# Patient Record
Sex: MALE | Race: WHITE | NOT HISPANIC OR LATINO | Employment: OTHER | URBAN - METROPOLITAN AREA
[De-identification: names, ages, dates, MRNs, and addresses within clinical notes are randomized per-mention and may not be internally consistent; named-entity substitution may affect disease eponyms.]

---

## 2017-05-19 ENCOUNTER — TELEPHONE (OUTPATIENT)
Dept: CARDIOLOGY | Facility: CLINIC | Age: 72
End: 2017-05-19

## 2017-06-01 ENCOUNTER — OFFICE VISIT (OUTPATIENT)
Dept: CARDIOLOGY | Facility: CLINIC | Age: 72
End: 2017-06-01

## 2017-06-01 VITALS
DIASTOLIC BLOOD PRESSURE: 90 MMHG | BODY MASS INDEX: 23.86 KG/M2 | HEIGHT: 67 IN | WEIGHT: 152 LBS | HEART RATE: 50 BPM | SYSTOLIC BLOOD PRESSURE: 158 MMHG

## 2017-06-01 DIAGNOSIS — I73.9 PAD (PERIPHERAL ARTERY DISEASE) (HCC): ICD-10-CM

## 2017-06-01 DIAGNOSIS — I25.10 CORONARY ARTERY DISEASE INVOLVING NATIVE CORONARY ARTERY OF NATIVE HEART WITHOUT ANGINA PECTORIS: ICD-10-CM

## 2017-06-01 DIAGNOSIS — R00.1 SINUS BRADYCARDIA: Primary | ICD-10-CM

## 2017-06-01 PROCEDURE — 99213 OFFICE O/P EST LOW 20 MIN: CPT | Performed by: INTERNAL MEDICINE

## 2017-06-01 PROCEDURE — 93000 ELECTROCARDIOGRAM COMPLETE: CPT | Performed by: INTERNAL MEDICINE

## 2017-06-01 RX ORDER — LOSARTAN POTASSIUM 50 MG/1
50 TABLET ORAL 2 TIMES DAILY
COMMUNITY

## 2017-06-01 NOTE — PROGRESS NOTES
Subjective:     Encounter Date:06/01/2017      Patient ID: Vinod Cardenas is a 72 y.o. male.    Chief Complaint: CAD, PAD, bradycardia    History of Present Illness     Dear Ritesh Keller,     I had the pleasure of seeing your patient Vinod Cardenas in cardiac followup today.  As you well know, he is a nay 72-year-old man with history of smoking and lung cancer.  He has mild coronary artery disease on a heart catheterization.  He has had calf claudication which is mild.     It has been a year and a half since I have last seen him.  Over this time, his lung cancer has been quite stable.  He says it has not changed in some time.      He has developed symptomatic bradycardia.  His heart rate runs in the 40s to 50s.  He had a Holter monitor which revealed a bradycardia burden of 64%.  He also had frequent premature ventricular contractions.      His symptoms include dizziness and fatigue.  He has had no syncope.  He does feel palpitations.  He continues to smoke about one-third of a pack of cigarettes per day.          Review of Systems   All other systems reviewed and are negative.        ECG 12 Lead  Date/Time: 6/1/2017 10:19 AM  Performed by: LYNDSEY BHAT  Authorized by: LYNDSEY BHAT   Comparison: compared with previous ECG   Similar to previous ECG  Rhythm: sinus bradycardia  BPM: 50  Other findings: early transition               Objective:     Physical Exam   Constitutional: He is oriented to person, place, and time. He appears well-developed and well-nourished.   HENT:   Head: Normocephalic and atraumatic.   Neck: Normal range of motion. Neck supple.   Cardiovascular: Regular rhythm and normal heart sounds.  Bradycardia present.    Pulmonary/Chest: Effort normal and breath sounds normal.   Abdominal: Soft. Bowel sounds are normal.   Musculoskeletal: Normal range of motion.   Neurological: He is alert and oriented to person, place, and time.   Skin: Skin is warm and dry.   Psychiatric: He has a normal mood and  affect. His behavior is normal. Thought content normal.   Vitals reviewed.      Lab Review:       Assessment:          Diagnosis Plan   1. Sinus bradycardia     2. Coronary artery disease involving native coronary artery of native heart without angina pectoris     3. PAD (peripheral artery disease)            Plan:        It was a pleasure to see your patient in cardiac followup today.  His mild coronary artery disease is stable and asymptomatic.  He has mild peripheral arterial disease with claudication that is not limiting.  He has symptomatic bradycardia.  I offered him implantation of a dual chamber pacemaker.  Because he gets periodic MRIs for his lung cancer, he would have to have an MRI safe device.     He states that he would like to hold off of this for now as it is not an urgent issue.  He says that if his symptoms worsen or if he develops any worsened signs he will call me back to schedule this procedure.  Otherwise I will see him again in one year.      Coronary Artery Disease  Assessment  • The patient has no angina    Plan  • Lifestyle modifications discussed include adhering to a heart healthy diet, avoidance of tobacco products, maintenance of a healthy weight, medication compliance, regular exercise and regular monitoring of cholesterol and blood pressure    Subjective - Objective  • Current antiplatelet therapy includes aspirin 81 mg

## 2017-06-29 DIAGNOSIS — R00.1 SYMPTOMATIC BRADYCARDIA: Primary | ICD-10-CM

## 2017-07-21 ENCOUNTER — CLINICAL SUPPORT NO REQUIREMENTS (OUTPATIENT)
Dept: CARDIOLOGY | Facility: CLINIC | Age: 72
End: 2017-07-21

## 2017-07-21 ENCOUNTER — HOSPITAL ENCOUNTER (OUTPATIENT)
Facility: HOSPITAL | Age: 72
Setting detail: HOSPITAL OUTPATIENT SURGERY
Discharge: HOME | End: 2017-07-21
Attending: INTERNAL MEDICINE | Admitting: INTERNAL MEDICINE

## 2017-07-21 VITALS
RESPIRATION RATE: 16 BRPM | SYSTOLIC BLOOD PRESSURE: 145 MMHG | WEIGHT: 154 LBS | DIASTOLIC BLOOD PRESSURE: 82 MMHG | HEIGHT: 69 IN | BODY MASS INDEX: 22.81 KG/M2 | TEMPERATURE: 98.2 F | HEART RATE: 60 BPM | OXYGEN SATURATION: 97 %

## 2017-07-21 DIAGNOSIS — R00.1 SYMPTOMATIC BRADYCARDIA: ICD-10-CM

## 2017-07-21 DIAGNOSIS — R00.1 BRADYCARDIA: Primary | ICD-10-CM

## 2017-07-21 PROCEDURE — C1894 INTRO/SHEATH, NON-LASER: HCPCS | Performed by: INTERNAL MEDICINE

## 2017-07-21 PROCEDURE — 99153 MOD SED SAME PHYS/QHP EA: CPT | Performed by: INTERNAL MEDICINE

## 2017-07-21 PROCEDURE — C1785 PMKR, DUAL, RATE-RESP: HCPCS | Performed by: INTERNAL MEDICINE

## 2017-07-21 PROCEDURE — C1898 LEAD, PMKR, OTHER THAN TRANS: HCPCS | Performed by: INTERNAL MEDICINE

## 2017-07-21 PROCEDURE — C1769 GUIDE WIRE: HCPCS | Performed by: INTERNAL MEDICINE

## 2017-07-21 PROCEDURE — C1892 INTRO/SHEATH,FIXED,PEEL-AWAY: HCPCS | Performed by: INTERNAL MEDICINE

## 2017-07-21 PROCEDURE — 25010000002 FENTANYL CITRATE (PF) 100 MCG/2ML SOLUTION: Performed by: INTERNAL MEDICINE

## 2017-07-21 PROCEDURE — 99152 MOD SED SAME PHYS/QHP 5/>YRS: CPT | Performed by: INTERNAL MEDICINE

## 2017-07-21 PROCEDURE — 33208 INSRT HEART PM ATRIAL & VENT: CPT | Performed by: INTERNAL MEDICINE

## 2017-07-21 PROCEDURE — 25010000003 CEFAZOLIN IN D5W 1 GM/50ML SOLUTION: Performed by: INTERNAL MEDICINE

## 2017-07-21 PROCEDURE — 25010000002 MIDAZOLAM PER 1 MG: Performed by: INTERNAL MEDICINE

## 2017-07-21 DEVICE — IMPLANTABLE DEVICE: Type: IMPLANTABLE DEVICE | Status: FUNCTIONAL

## 2017-07-21 DEVICE — PACEMAKER
Type: IMPLANTABLE DEVICE | Status: FUNCTIONAL
Brand: ACCOLADE™ MRI DR

## 2017-07-21 RX ORDER — LIDOCAINE HYDROCHLORIDE 10 MG/ML
0.1 INJECTION, SOLUTION EPIDURAL; INFILTRATION; INTRACAUDAL; PERINEURAL ONCE AS NEEDED
Status: DISCONTINUED | OUTPATIENT
Start: 2017-07-21 | End: 2017-07-21 | Stop reason: HOSPADM

## 2017-07-21 RX ORDER — LIDOCAINE HYDROCHLORIDE 10 MG/ML
INJECTION, SOLUTION INFILTRATION; PERINEURAL AS NEEDED
Status: DISCONTINUED | OUTPATIENT
Start: 2017-07-21 | End: 2017-07-21 | Stop reason: HOSPADM

## 2017-07-21 RX ORDER — SODIUM CHLORIDE 9 MG/ML
75 INJECTION, SOLUTION INTRAVENOUS CONTINUOUS
Status: DISCONTINUED | OUTPATIENT
Start: 2017-07-21 | End: 2017-07-21 | Stop reason: HOSPADM

## 2017-07-21 RX ORDER — FENTANYL CITRATE 50 UG/ML
INJECTION, SOLUTION INTRAMUSCULAR; INTRAVENOUS AS NEEDED
Status: DISCONTINUED | OUTPATIENT
Start: 2017-07-21 | End: 2017-07-21 | Stop reason: HOSPADM

## 2017-07-21 RX ORDER — OXYCODONE HYDROCHLORIDE AND ACETAMINOPHEN 5; 325 MG/1; MG/1
1-2 TABLET ORAL EVERY 4 HOURS PRN
Qty: 15 TABLET | Refills: 0 | Status: SHIPPED | OUTPATIENT
Start: 2017-07-21

## 2017-07-21 RX ORDER — SODIUM CHLORIDE 0.9 % (FLUSH) 0.9 %
1-10 SYRINGE (ML) INJECTION AS NEEDED
Status: DISCONTINUED | OUTPATIENT
Start: 2017-07-21 | End: 2017-07-21 | Stop reason: HOSPADM

## 2017-07-21 RX ORDER — MIDAZOLAM HYDROCHLORIDE 1 MG/ML
INJECTION INTRAMUSCULAR; INTRAVENOUS AS NEEDED
Status: DISCONTINUED | OUTPATIENT
Start: 2017-07-21 | End: 2017-07-21 | Stop reason: HOSPADM

## 2017-07-21 RX ADMIN — CEFAZOLIN SODIUM 1 G: 1 INJECTION, SOLUTION INTRAVENOUS at 08:11

## 2017-07-21 RX ADMIN — SODIUM CHLORIDE 75 ML/HR: 9 INJECTION, SOLUTION INTRAVENOUS at 07:14

## 2017-07-21 NOTE — DISCHARGE INSTRUCTIONS
"[facility] Post Pacemaker / Defibrillator Implant Instructions    1. Resume aspirin on ________tomorrow__________________________.    2. The dressing may be removed the next day.    3. If steri-strips were used, they should not be removed. Allow them to \"fall off\".    4. You may shower after the dressing is removed. Do not allow shower water to hit directly on incision.    5. No lotion/powder/ointment/cream on incision until it is healed.    6. Gently wash incision daily with antibacterial soap and water and pat dry.    7. You may reapply a dressing if there is drainage, otherwise leave your incision open to air. If you reapply a dressing, please notify the pacemaker clinic.    8. No heavy lifting, pulling, or pushing.    9. Do not raise the affected arm over your head for a minimum of 1 month.    10. The pacemaker clinic will contact you (usually within 1 business day) to schedule a pacemaker/incision check. The check is usually done 7-10 days post-implant. If you have not heard from the pacemaker clinic within 3 days, please call the office.    11. Please call the office if you experience any of the following:  Ÿ bleeding or drainage from your incision  Ÿ swelling, redness, or opening of your incision  Ÿ fever or chills  Ÿ pain not relieved with medication  Ÿ chest pain or difficulty breathing  Ÿ lightheadness    12. For defibrillator patients only: If you receive a shock from your device, please call the office. If you receive 2 or more shocks within a 24 hour period OR if you receive 1 shock and feel poorly, you should be evaluated in the emergency room. Please DO NOT DRIVE if you have received a shock until your device has been checked.  "

## 2017-07-21 NOTE — H&P
History of Present Illness      Dear Ritesh Keller,      I had the pleasure of seeing your patient Vinod Cardenas in cardiac followup today.  As you well know, he is a nay 72-year-old man with history of smoking and lung cancer.  He has mild coronary artery disease on a heart catheterization.  He has had calf claudication which is mild.      It has been a year and a half since I have last seen him.  Over this time, his lung cancer has been quite stable.  He says it has not changed in some time.       He has developed symptomatic bradycardia.  His heart rate runs in the 40s to 50s.  He had a Holter monitor which revealed a bradycardia burden of 64%.  He also had frequent premature ventricular contractions.       His symptoms include dizziness and fatigue.  He has had no syncope.  He does feel palpitations.  He continues to smoke about one-third of a pack of cigarettes per day.             Review of Systems   All other systems reviewed and are negative.          ECG 12 Lead  Date/Time: 6/1/2017 10:19 AM  Performed by: LYNDSEY BHAT  Authorized by: LYNDSEY BHAT   Comparison: compared with previous ECG   Similar to previous ECG  Rhythm: sinus bradycardia  BPM: 50  Other findings: early transition               Objective:      Objective    Physical Exam   Constitutional: He is oriented to person, place, and time. He appears well-developed and well-nourished.   HENT:   Head: Normocephalic and atraumatic.   Neck: Normal range of motion. Neck supple.   Cardiovascular: Regular rhythm and normal heart sounds.  Bradycardia present.    Pulmonary/Chest: Effort normal and breath sounds normal.   Abdominal: Soft. Bowel sounds are normal.   Musculoskeletal: Normal range of motion.   Neurological: He is alert and oriented to person, place, and time.   Skin: Skin is warm and dry.   Psychiatric: He has a normal mood and affect. His behavior is normal. Thought content normal.   Vitals reviewed.        Lab Review:         Assessment:       Assessment            Diagnosis Plan   1. Sinus bradycardia      2. Coronary artery disease involving native coronary artery of native heart without angina pectoris      3. PAD (peripheral artery disease)               Plan:      Plan        It was a pleasure to see your patient in cardiac followup today.  His mild coronary artery disease is stable and asymptomatic.  He has mild peripheral arterial disease with claudication that is not limiting.  He has symptomatic bradycardia.  I offered him implantation of a dual chamber pacemaker.  Because he gets periodic MRIs for his lung cancer, he would have to have an MRI safe device.      He states that he would like to hold off of this for now as it is not an urgent issue.  He says that if his symptoms worsen or if he develops any worsened signs he will call me back to schedule this procedure.  Otherwise I will see him again in one year.       Coronary Artery Disease  Assessment  • The patient has no angina   Plan  • Lifestyle modifications discussed include adhering to a heart healthy diet, avoidance of tobacco products, maintenance of a healthy weight, medication compliance, regular exercise and regular monitoring of cholesterol and blood pressure   Subjective - Objective  • Current antiplatelet therapy includes aspirin 81 mg         H&P reviewed. The patient was examined and there are no changes to the H&P.

## 2017-07-21 NOTE — PLAN OF CARE
Problem: Patient Care Overview (Adult)  Goal: Plan of Care Review  Outcome: Outcome(s) achieved Date Met:  07/21/17 07/21/17 1008   Coping/Psychosocial Response Interventions   Plan Of Care Reviewed With patient;family   Patient Care Overview   Progress improving   Outcome Evaluation   Outcome Summary/Follow up Plan ready for discharge       Goal: Adult Individualization and Mutuality  Outcome: Outcome(s) achieved Date Met:  07/21/17  Goal: Discharge Needs Assessment  Outcome: Outcome(s) achieved Date Met:  07/21/17    Problem: Cardiac Rhythm Management Device (Adult)  Goal: Signs and Symptoms of Listed Potential Problems Will be Absent or Manageable (Cardiac Rhythm Management Device)  Outcome: Outcome(s) achieved Date Met:  07/21/17

## 2017-08-01 ENCOUNTER — CLINICAL SUPPORT NO REQUIREMENTS (OUTPATIENT)
Dept: CARDIOLOGY | Facility: CLINIC | Age: 72
End: 2017-08-01

## 2017-08-01 DIAGNOSIS — I49.5 SICK SINUS SYNDROME (HCC): Primary | ICD-10-CM

## 2017-08-01 PROCEDURE — 93280 PM DEVICE PROGR EVAL DUAL: CPT | Performed by: INTERNAL MEDICINE

## 2018-01-09 ENCOUNTER — CLINICAL SUPPORT NO REQUIREMENTS (OUTPATIENT)
Dept: CARDIOLOGY | Facility: CLINIC | Age: 73
End: 2018-01-09

## 2018-01-09 DIAGNOSIS — I49.5 SICK SINUS SYNDROME (HCC): Primary | ICD-10-CM

## 2018-01-09 PROCEDURE — 93280 PM DEVICE PROGR EVAL DUAL: CPT | Performed by: INTERNAL MEDICINE

## 2018-04-13 ENCOUNTER — CLINICAL SUPPORT NO REQUIREMENTS (OUTPATIENT)
Dept: CARDIOLOGY | Facility: CLINIC | Age: 73
End: 2018-04-13

## 2018-04-13 DIAGNOSIS — I49.5 SICK SINUS SYNDROME (HCC): Primary | ICD-10-CM

## 2018-04-13 PROCEDURE — 93296 REM INTERROG EVL PM/IDS: CPT | Performed by: INTERNAL MEDICINE

## 2018-04-13 PROCEDURE — 93294 REM INTERROG EVL PM/LDLS PM: CPT | Performed by: INTERNAL MEDICINE

## 2018-07-16 ENCOUNTER — CLINICAL SUPPORT NO REQUIREMENTS (OUTPATIENT)
Dept: CARDIOLOGY | Facility: CLINIC | Age: 73
End: 2018-07-16

## 2018-07-16 DIAGNOSIS — I49.5 SICK SINUS SYNDROME (HCC): Primary | ICD-10-CM

## 2018-07-16 PROCEDURE — 93280 PM DEVICE PROGR EVAL DUAL: CPT | Performed by: INTERNAL MEDICINE

## 2018-10-18 ENCOUNTER — CLINICAL SUPPORT NO REQUIREMENTS (OUTPATIENT)
Dept: CARDIOLOGY | Facility: CLINIC | Age: 73
End: 2018-10-18

## 2018-10-18 DIAGNOSIS — I49.5 SICK SINUS SYNDROME (HCC): Primary | ICD-10-CM

## 2018-10-18 PROCEDURE — 93294 REM INTERROG EVL PM/LDLS PM: CPT | Performed by: INTERNAL MEDICINE

## 2018-10-18 PROCEDURE — 93296 REM INTERROG EVL PM/IDS: CPT | Performed by: INTERNAL MEDICINE

## 2019-02-11 ENCOUNTER — CLINICAL SUPPORT NO REQUIREMENTS (OUTPATIENT)
Dept: CARDIOLOGY | Facility: CLINIC | Age: 74
End: 2019-02-11

## 2019-02-11 DIAGNOSIS — I49.5 SICK SINUS SYNDROME (HCC): Primary | ICD-10-CM

## 2019-02-11 PROCEDURE — 93294 REM INTERROG EVL PM/LDLS PM: CPT | Performed by: INTERNAL MEDICINE

## 2019-02-11 PROCEDURE — 93296 REM INTERROG EVL PM/IDS: CPT | Performed by: INTERNAL MEDICINE

## 2019-06-07 ENCOUNTER — CLINICAL SUPPORT NO REQUIREMENTS (OUTPATIENT)
Dept: CARDIOLOGY | Facility: CLINIC | Age: 74
End: 2019-06-07

## 2019-06-07 DIAGNOSIS — I49.5 SICK SINUS SYNDROME (HCC): Primary | ICD-10-CM

## 2019-06-07 PROCEDURE — 93280 PM DEVICE PROGR EVAL DUAL: CPT | Performed by: INTERNAL MEDICINE

## 2019-09-09 ENCOUNTER — CLINICAL SUPPORT NO REQUIREMENTS (OUTPATIENT)
Dept: CARDIOLOGY | Facility: CLINIC | Age: 74
End: 2019-09-09

## 2019-09-09 DIAGNOSIS — I49.5 SICK SINUS SYNDROME (HCC): Primary | ICD-10-CM

## 2019-09-09 PROCEDURE — 93296 REM INTERROG EVL PM/IDS: CPT | Performed by: INTERNAL MEDICINE

## 2019-09-09 PROCEDURE — 93294 REM INTERROG EVL PM/LDLS PM: CPT | Performed by: INTERNAL MEDICINE

## 2019-09-15 ENCOUNTER — CLINICAL SUPPORT NO REQUIREMENTS (OUTPATIENT)
Dept: CARDIOLOGY | Facility: CLINIC | Age: 74
End: 2019-09-15

## 2019-09-15 DIAGNOSIS — I49.5 SICK SINUS SYNDROME (HCC): Primary | ICD-10-CM

## 2020-01-03 ENCOUNTER — CLINICAL SUPPORT NO REQUIREMENTS (OUTPATIENT)
Dept: CARDIOLOGY | Facility: CLINIC | Age: 75
End: 2020-01-03

## 2020-01-03 DIAGNOSIS — I49.5 SICK SINUS SYNDROME (HCC): Primary | ICD-10-CM

## 2020-01-03 PROCEDURE — 93280 PM DEVICE PROGR EVAL DUAL: CPT | Performed by: INTERNAL MEDICINE

## 2020-04-09 ENCOUNTER — CLINICAL SUPPORT NO REQUIREMENTS (OUTPATIENT)
Dept: CARDIOLOGY | Facility: CLINIC | Age: 75
End: 2020-04-09

## 2020-04-09 DIAGNOSIS — R00.1 SYMPTOMATIC BRADYCARDIA: Primary | ICD-10-CM

## 2020-04-09 PROCEDURE — 93296 REM INTERROG EVL PM/IDS: CPT | Performed by: INTERNAL MEDICINE

## 2020-04-09 PROCEDURE — 93294 REM INTERROG EVL PM/LDLS PM: CPT | Performed by: INTERNAL MEDICINE

## 2021-02-12 ENCOUNTER — CLINICAL SUPPORT NO REQUIREMENTS (OUTPATIENT)
Dept: CARDIOLOGY | Facility: CLINIC | Age: 76
End: 2021-02-12

## 2021-02-12 DIAGNOSIS — I49.5 SICK SINUS SYNDROME (HCC): Primary | ICD-10-CM

## 2021-02-12 PROCEDURE — 93280 PM DEVICE PROGR EVAL DUAL: CPT | Performed by: INTERNAL MEDICINE

## 2021-05-27 ENCOUNTER — OFFICE VISIT (OUTPATIENT)
Dept: CARDIOLOGY | Facility: CLINIC | Age: 76
End: 2021-05-27

## 2021-05-27 VITALS
HEART RATE: 59 BPM | SYSTOLIC BLOOD PRESSURE: 150 MMHG | DIASTOLIC BLOOD PRESSURE: 78 MMHG | BODY MASS INDEX: 22.22 KG/M2 | WEIGHT: 150 LBS | HEIGHT: 69 IN | OXYGEN SATURATION: 98 %

## 2021-05-27 DIAGNOSIS — R06.09 EXERTIONAL DYSPNEA: Primary | ICD-10-CM

## 2021-05-27 PROCEDURE — 99204 OFFICE O/P NEW MOD 45 MIN: CPT | Performed by: INTERNAL MEDICINE

## 2021-05-27 RX ORDER — VARENICLINE TARTRATE 1 MG/1
TABLET, FILM COATED ORAL
COMMUNITY
Start: 2021-05-26

## 2021-05-27 RX ORDER — AMLODIPINE BESYLATE 5 MG/1
TABLET ORAL
COMMUNITY
Start: 2021-03-05

## 2021-05-27 RX ORDER — MELOXICAM 15 MG/1
15 TABLET ORAL DAILY PRN
COMMUNITY
Start: 2021-04-05

## 2021-05-27 NOTE — PROGRESS NOTES
[facility]      Patient Name: Vinod Cardenas  :1945  Age: 76 y.o.  Encounter Provider:  Eligio Abdullahi Jr, MD      Chief Complaint:   Chief Complaint   Patient presents with   • Coronary Artery Disease     follow up         HPI  Vinod Cardenas is a 76 y.o. male past medical history of nonobstructive coronary artery disease, lung cancer status post chemotherapy and radiation treatment currently in remission, ongoing tobacco abuse and hypertension who presents for initial evaluation with me.  Last time he was seen was in 2017 at which time he was having symptomatic bradycardia.  Pacemaker placed without complication patient has no further issues with dizziness.  Continues to have exertional dyspnea.  He denies chest pain or palpitations.  No syncope but complains of balance issues.  He is noted to have frequent ventricular ectopy on recent device interrogation.  As stated above he continues to smoke cigarettes.  He denies alcohol or illicit drug use.  Family history was reviewed and is not pertinent to this clinic visit.      The following portions of the patient's history were reviewed and updated as appropriate: allergies, current medications, past family history, past medical history, past social history, past surgical history and problem list.      Review of Systems   Constitutional: Negative for chills and fever.   HENT: Negative for hoarse voice and sore throat.    Eyes: Negative for double vision and photophobia.   Cardiovascular: Positive for dyspnea on exertion. Negative for chest pain, leg swelling, near-syncope, orthopnea, palpitations, paroxysmal nocturnal dyspnea and syncope.   Respiratory: Negative for cough and wheezing.    Skin: Negative for poor wound healing and rash.   Musculoskeletal: Negative for arthritis and joint swelling.   Gastrointestinal: Negative for bloating, abdominal pain, hematemesis and hematochezia.   Neurological: Positive for disturbances in  "coordination and loss of balance. Negative for dizziness and focal weakness.   Psychiatric/Behavioral: Negative for depression and suicidal ideas.       OBJECTIVE:   Vital Signs  Vitals:    05/27/21 1037   BP: 150/78   Pulse: 59   SpO2: 98%     Estimated body mass index is 22.15 kg/m² as calculated from the following:    Height as of this encounter: 175.3 cm (69\").    Weight as of this encounter: 68 kg (150 lb).    Vitals reviewed.   Constitutional:       Appearance: Not in distress. Cachectic and frail.   Neck:      Vascular: No JVR. JVD normal.   Pulmonary:      Effort: Pulmonary effort is normal.      Breath sounds: Normal breath sounds. No wheezing. No rhonchi. No rales.   Chest:      Chest wall: Not tender to palpatation.   Cardiovascular:      PMI at left midclavicular line. Normal rate. Regular rhythm. Normal S1. Normal S2.      Murmurs: There is no murmur.      No gallop. No click. No rub.   Pulses:     Intact distal pulses.   Edema:     Peripheral edema absent.   Abdominal:      General: Bowel sounds are normal.      Palpations: Abdomen is soft.      Tenderness: There is no abdominal tenderness.   Musculoskeletal: Normal range of motion.         General: No tenderness. Skin:     General: Skin is warm and dry.   Neurological:      General: No focal deficit present.      Mental Status: Alert and oriented to person, place and time.         Procedures          ASSESSMENT:     Symptomatic bradycardia status post pacemaker implantation  Frequent ventricular ectopy  Exertional dyspnea  Tobacco abuse  History of lung cancer and chemoradiation    PLAN OF CARE:     1. Frequent PVCs -patient seems asymptomatic.  Check echocardiogram especially concerning history of exposure to chemotherapy and thoracic radiation.  Monitor symptoms closely.  2. Symptomatic bradycardia -monitor closely in pacemaker clinic.  Normal device function.  3. Exertional dyspnea -checking echocardiogram.  No chest pain.  4. Tobacco abuse " -counseled on need for abstinence from nicotine products.  5. History of lung cancer and exposure to chemoradiation -echocardiogram as above    Return to clinic 6 months             Discharge Medications          Accurate as of May 27, 2021  5:10 PM. If you have any questions, ask your nurse or doctor.            Continue These Medications      Instructions Start Date   AMLODIPINE BESYLATE PO   2.5 mg, Oral, Daily      amLODIPine 5 MG tablet  Commonly known as: NORVASC   TAKE 1 TABLET BY MOUTH TWICE DAILY FOR HIGH BLOOD PRESSURE      aspirin 81 MG tablet   81 mg, Oral, Daily      carvedilol 6.25 MG tablet  Commonly known as: COREG   6.25 mg, Oral, Every Morning, Per pt, takes 3.125mg in the evening      Chantix Continuing Month Chai 1 MG tablet  Generic drug: varenicline   No dose, route, or frequency recorded.      furosemide 20 MG tablet  Commonly known as: LASIX   20 mg, Oral, Daily      levothyroxine 50 MCG tablet  Commonly known as: SYNTHROID, LEVOTHROID   50 mcg, Oral, Daily      loratadine 10 MG tablet  Commonly known as: CLARITIN   10 mg, Oral, Daily      losartan 50 MG tablet  Commonly known as: COZAAR   50 mg, Oral, 2 Times Daily      meloxicam 15 MG tablet  Commonly known as: MOBIC   15 mg, Oral, Daily      oxyCODONE-acetaminophen 5-325 MG per tablet  Commonly known as: PERCOCET   1-2 tablets, Oral, Every 4 Hours PRN      pravastatin 20 MG tablet  Commonly known as: PRAVACHOL   20 mg, Oral, Nightly             Thank you for allowing me to participate in the care of your patient,      Sincerely,   Eligio Abdullahi Jr, MD  [facility]  05/27/21  17:10 EDT

## 2021-06-04 ENCOUNTER — TELEPHONE (OUTPATIENT)
Dept: CARDIOLOGY | Facility: CLINIC | Age: 76
End: 2021-06-04

## 2021-07-16 ENCOUNTER — TELEPHONE (OUTPATIENT)
Dept: CARDIOLOGY | Facility: CLINIC | Age: 76
End: 2021-07-16

## 2021-10-11 PROCEDURE — 93296 REM INTERROG EVL PM/IDS: CPT | Performed by: INTERNAL MEDICINE

## 2021-10-11 PROCEDURE — 93294 REM INTERROG EVL PM/LDLS PM: CPT | Performed by: INTERNAL MEDICINE

## 2021-10-13 ENCOUNTER — TELEPHONE (OUTPATIENT)
Dept: URGENT CARE | Facility: CLINIC | Age: 76
End: 2021-10-13

## 2021-10-13 NOTE — TELEPHONE ENCOUNTER
I CALLED DR ANDINO'S OFFICE AND SPOKE TO MARGIE. I ADVISED HER THAT WE SAW MR DEAL TODAY AND TREATING;HIM FOR PNEUMONIA. I ALSO ADVISED HER THAT WITH HIS HISTORY OF LUNG CANCER THE RADIOLOGIST IS RECOMMENDING FOLLOW UP ON THIS CHEST XRAY. I INFORMED HER I WILL FAX THE XRAY REPORT AND OFFICE NOTES TO HER. I DID TELL HER THAT MR DEAL WAS ADVISED TO CALL AND SET UP APPT WITH DR ANDINO FOR FOLLOW UP.

## 2021-10-14 ENCOUNTER — TELEPHONE (OUTPATIENT)
Dept: URGENT CARE | Facility: CLINIC | Age: 76
End: 2021-10-14

## 2021-10-14 NOTE — TELEPHONE ENCOUNTER
I CALLED PATIENT TO GIVE HIM HIS COVID TEST RESULTS. I ALSO ASKED HOW HE WAS DOING; HE STATED HE WAS NOT COUGHING AS MUCH AND WAS ABLE TO REST. HE STATED HE TRIED TO CALL DR ANDINO'S OFFICE BUT THEY WERE AT [facility]UN[place]. I INFORMED HIM HE NEEDED TO GO AHEAD AND CALL THEM NOW TO SET UP FOLLOW UP APPT. hE THEN STATED THAT THIS MORNING HE COUGHED UP BRIGHT RED BLOOD A COUPLE OF TIMES. I SPOKE TO TOSHA Luo, AND SHE ADVISED HE NEEDS TO GO TO ER TO BE EVALUATED DUE TO HIS HEALTH HISTORY. I INFORMED MR DEAL THAT HE NEEDED TO  GO AHEAD AND CALL DR ANDINO'S OFFICE NOW BUT THAT SHAHEEN STATED WITH HIS HEALTH HISTORY HE NEEDED TO GO TO THE ER AND BE EVALUATED.

## 2021-10-15 ENCOUNTER — TELEPHONE (OUTPATIENT)
Dept: URGENT CARE | Facility: CLINIC | Age: 76
End: 2021-10-15

## 2022-01-10 PROCEDURE — 93294 REM INTERROG EVL PM/LDLS PM: CPT | Performed by: INTERNAL MEDICINE

## 2022-01-10 PROCEDURE — 93296 REM INTERROG EVL PM/IDS: CPT | Performed by: INTERNAL MEDICINE

## 2022-02-25 ENCOUNTER — CLINICAL SUPPORT NO REQUIREMENTS (OUTPATIENT)
Dept: CARDIOLOGY | Facility: CLINIC | Age: 77
End: 2022-02-25

## 2022-02-25 DIAGNOSIS — I49.5 SICK SINUS SYNDROME: Primary | ICD-10-CM

## 2022-02-25 PROCEDURE — 93280 PM DEVICE PROGR EVAL DUAL: CPT | Performed by: INTERNAL MEDICINE

## 2022-02-27 ENCOUNTER — TELEPHONE (OUTPATIENT)
Dept: CARDIOLOGY | Facility: CLINIC | Age: 77
End: 2022-02-27

## 2022-03-03 ENCOUNTER — OFFICE VISIT (OUTPATIENT)
Dept: CARDIOLOGY | Facility: CLINIC | Age: 77
End: 2022-03-03

## 2022-03-03 DIAGNOSIS — I49.5 SICK SINUS SYNDROME: Primary | ICD-10-CM

## 2022-03-03 DIAGNOSIS — R06.09 EXERTIONAL DYSPNEA: ICD-10-CM

## 2022-03-03 PROCEDURE — 99214 OFFICE O/P EST MOD 30 MIN: CPT | Performed by: INTERNAL MEDICINE

## 2022-03-03 NOTE — PROGRESS NOTES
[facility]      Patient Name: Vinod Cardenas  :1945  Age: 76 y.o.  Encounter Provider:  Eligio Abdullahi Jr, MD      Chief Complaint: Follow-up for evaluation and management of chronic medical issues      HPI  Vinod Cardenas is a 76 y.o. male past medical history of nonobstructive coronary artery disease, lung cancer status post chemotherapy and radiation treatment currently in remission, ongoing tobacco abuse and hypertension who presents for follow-up with me.  Last time he was seen was in 2017 at which time he was having symptomatic bradycardia.  Pacemaker placed without complication patient has no further issues with dizziness.  Continues to have exertional dyspnea.  He denies chest pain or palpitations.  No syncope but complains of balance issues.  He is noted to have frequent ventricular ectopy on recent device interrogation.  As stated above he continues to smoke cigarettes.  He denies alcohol or illicit drug use.     He is doing very well since last visit.  Chronic stable exertional dyspnea.  He started taking Chantix but noticed there was a cancer warning on the label and stopped taking the medication.  He still smoking about one third of a pack per day.  He denies angina.  No palpitations but occasional dizziness.  He feels that this is postural in nature and happens infrequently.  No episodes of syncope.  No orthopnea, PND or edema.  I reviewed the echocardiogram from last visit which showed normal heart function with no significant valvular heart disease.  Family history was reviewed and is not pertinent to this clinic visit.      The following portions of the patient's history were reviewed and updated as appropriate: allergies, current medications, past family history, past medical history, past social history, past surgical history and problem list.      Review of Systems   Constitutional: Negative for chills and fever.   HENT: Negative for hoarse voice and sore  "throat.    Eyes: Negative for double vision and photophobia.   Cardiovascular: Positive for dyspnea on exertion. Negative for chest pain, leg swelling, near-syncope, orthopnea, palpitations, paroxysmal nocturnal dyspnea and syncope.   Respiratory: Negative for cough and wheezing.    Skin: Negative for poor wound healing and rash.   Musculoskeletal: Negative for arthritis and joint swelling.   Gastrointestinal: Negative for bloating, abdominal pain, hematemesis and hematochezia.   Neurological: Positive for disturbances in coordination and loss of balance. Negative for dizziness and focal weakness.   Psychiatric/Behavioral: Negative for depression and suicidal ideas.     ROS was reviewed, updated and amended when necessary.    OBJECTIVE:   Vital Signs  There were no vitals filed for this visit.  Estimated body mass index is 22.15 kg/m² as calculated from the following:    Height as of 10/13/21: 175.3 cm (69\").    Weight as of 10/13/21: 68 kg (150 lb).    Vitals reviewed.   Constitutional:       Appearance: Not in distress. Cachectic and frail.   Neck:      Vascular: No JVR. JVD normal.   Pulmonary:      Effort: Pulmonary effort is normal.      Breath sounds: Normal breath sounds. No wheezing. No rhonchi. No rales.   Chest:      Chest wall: Not tender to palpatation.   Cardiovascular:      PMI at left midclavicular line. Normal rate. Regular rhythm. Normal S1. Normal S2.      Murmurs: There is no murmur.      No gallop. No click. No rub.   Pulses:     Intact distal pulses.   Edema:     Peripheral edema absent.   Abdominal:      General: Bowel sounds are normal.      Palpations: Abdomen is soft.      Tenderness: There is no abdominal tenderness.   Musculoskeletal: Normal range of motion.         General: No tenderness. Skin:     General: Skin is warm and dry.   Neurological:      General: No focal deficit present.      Mental Status: Alert and oriented to person, place and time.       Physical exam was reviewed, " updated and amended when necessary.    Procedures          ASSESSMENT:     Symptomatic bradycardia status post pacemaker implantation  Frequent ventricular ectopy  Exertional dyspnea  Tobacco abuse  History of lung cancer and chemoradiation    PLAN OF CARE:     1. Frequent PVCs -patient seems asymptomatic.  Echo with normal function.  Continue beta-blocker.  2. Symptomatic bradycardia -monitor closely in pacemaker clinic.  Device life 4.5 years.  Normal device function.  3. Exertional dyspnea -normal left ventricular systolic function.  More likely related to pulmonary issues.  Stable symptoms.  4. Tobacco abuse -counseled on need for abstinence from nicotine products.  5. History of lung cancer and exposure to chemoradiation -echocardiogram results showed normal left ventricular systolic function no significant valvular heart disease    Return to clinic 12 months             Discharge Medications          Accurate as of March 3, 2022 11:07 AM. If you have any questions, ask your nurse or doctor.            Continue These Medications      Instructions Start Date   AMLODIPINE BESYLATE PO   2.5 mg, Oral, Daily      amLODIPine 5 MG tablet  Commonly known as: NORVASC   TAKE 1 TABLET BY MOUTH TWICE DAILY FOR HIGH BLOOD PRESSURE      aspirin 81 MG tablet   81 mg, Oral, Daily      carvedilol 6.25 MG tablet  Commonly known as: COREG   6.25 mg, Oral, Every Morning, Per pt, takes 3.125mg in the evening      Chantix Continuing Month Chai 1 MG tablet  Generic drug: varenicline   No dose, route, or frequency recorded.      furosemide 20 MG tablet  Commonly known as: LASIX   20 mg, Oral, Daily      levothyroxine 50 MCG tablet  Commonly known as: SYNTHROID, LEVOTHROID   50 mcg, Oral, Daily      loratadine 10 MG tablet  Commonly known as: CLARITIN   10 mg, Oral, Daily      losartan 50 MG tablet  Commonly known as: COZAAR   50 mg, Oral, 2 Times Daily      meloxicam 15 MG tablet  Commonly known as: MOBIC   15 mg, Oral, Daily       methylPREDNISolone 4 MG dose pack  Commonly known as: MEDROL   Take as directed on package instructions.      oxyCODONE-acetaminophen 5-325 MG per tablet  Commonly known as: PERCOCET   1-2 tablets, Oral, Every 4 Hours PRN      pravastatin 20 MG tablet  Commonly known as: PRAVACHOL   20 mg, Oral, Nightly             Thank you for allowing me to participate in the care of your patient,      Sincerely,   Eligio Abdullahi Jr, MD  [facility]  03/03/22  11:07 EST

## 2022-03-04 ENCOUNTER — TELEPHONE (OUTPATIENT)
Dept: CARDIOLOGY | Facility: CLINIC | Age: 77
End: 2022-03-04

## 2022-03-04 VITALS
WEIGHT: 148.8 LBS | HEIGHT: 69 IN | HEART RATE: 66 BPM | DIASTOLIC BLOOD PRESSURE: 80 MMHG | BODY MASS INDEX: 22.04 KG/M2 | SYSTOLIC BLOOD PRESSURE: 146 MMHG

## 2022-03-04 NOTE — TELEPHONE ENCOUNTER
I just spoke to Dr Abdullahi. He didn't realize I saw pt last Friday for clinic testing. He doesn't need me to see him until he is due again.  NS

## 2022-03-04 NOTE — TELEPHONE ENCOUNTER
I just saw him last week and sent Dr Abdullahi a msg about his AF episodes? Pt has a remote monitor and due to send again April 1st. I know Dr Abdullahi asked to see pt in the office for a Dr Visit in response to my msg because its been over a year. Are you sure he wants me to see pt also? I can do an early transmission if he is looking for any further  AF episodes before his Dr appt. I wont be back at [facility] until 4/22/22.  NS

## 2022-05-02 ENCOUNTER — TELEPHONE (OUTPATIENT)
Dept: CARDIOLOGY | Facility: CLINIC | Age: 77
End: 2022-05-02

## 2022-05-12 ENCOUNTER — OFFICE VISIT (OUTPATIENT)
Dept: CARDIOLOGY | Facility: CLINIC | Age: 77
End: 2022-05-12

## 2022-05-12 VITALS
HEART RATE: 56 BPM | DIASTOLIC BLOOD PRESSURE: 70 MMHG | WEIGHT: 149.2 LBS | SYSTOLIC BLOOD PRESSURE: 130 MMHG | HEIGHT: 69 IN | BODY MASS INDEX: 22.1 KG/M2

## 2022-05-12 DIAGNOSIS — I49.5 SICK SINUS SYNDROME: Primary | ICD-10-CM

## 2022-05-12 DIAGNOSIS — I48.0 PAROXYSMAL ATRIAL FIBRILLATION: ICD-10-CM

## 2022-05-12 PROCEDURE — 99214 OFFICE O/P EST MOD 30 MIN: CPT | Performed by: INTERNAL MEDICINE

## 2022-05-13 RX ORDER — WARFARIN SODIUM 5 MG/1
5 TABLET ORAL
Qty: 30 TABLET | Refills: 11 | Status: SHIPPED | OUTPATIENT
Start: 2022-05-13

## 2022-05-16 PROCEDURE — 93000 ELECTROCARDIOGRAM COMPLETE: CPT | Performed by: INTERNAL MEDICINE

## 2022-05-16 NOTE — PROGRESS NOTES
[facility]      Patient Name: Vinod Cardenas  :1945  Age: 77 y.o.  Encounter Provider:  Eligio Abdullahi Jr, MD      Chief Complaint: Follow-up for evaluation and management of chronic medical issues      HPI  Vinod Cardenas is a 77 y.o. male past medical history of nonobstructive coronary artery disease, lung cancer status post chemotherapy and radiation treatment currently in remission, ongoing tobacco abuse and hypertension who presents for follow-up with me.  Last time he was seen was in 2017 at which time he was having symptomatic bradycardia.  Pacemaker placed without complication patient has no further issues with dizziness.  Continues to have exertional dyspnea.  He denies chest pain or palpitations.  No syncope but complains of balance issues.  He is noted to have frequent ventricular ectopy on recent device interrogation.  As stated above he continues to smoke cigarettes.  He denies alcohol or illicit drug use.     Recent device interrogations have shown high atrial rates with the most recent showing more convincing evidence for atrial fibrillation.  The longest incident was over 2 hours however his total burden is less than 1%.  He is relatively asymptomatic from this and continues to perform activities of daily living without limiting symptoms.  He denies chest pain or shortness of air.  No palpitations, dizziness syncope.  No orthopnea, PND or edema.  He has chronic stable dyspnea on exertion.  He is tolerating all other medications well.  [id number] score of 4.      The following portions of the patient's history were reviewed and updated as appropriate: allergies, current medications, past family history, past medical history, past social history, past surgical history and problem list.      Review of Systems   Constitutional: Negative for chills and fever.   HENT: Negative for hoarse voice and sore throat.    Eyes: Negative for double vision and photophobia.  "  Cardiovascular: Positive for dyspnea on exertion. Negative for chest pain, leg swelling, near-syncope, orthopnea, palpitations, paroxysmal nocturnal dyspnea and syncope.   Respiratory: Negative for cough and wheezing.    Skin: Negative for poor wound healing and rash.   Musculoskeletal: Negative for arthritis and joint swelling.   Gastrointestinal: Negative for bloating, abdominal pain, hematemesis and hematochezia.   Neurological: Positive for disturbances in coordination and loss of balance. Negative for dizziness and focal weakness.   Psychiatric/Behavioral: Negative for depression and suicidal ideas.     ROS was reviewed, updated and amended when necessary.    OBJECTIVE:   Vital Signs  Vitals:    05/12/22 0935   BP: 130/70   Pulse: 56     Estimated body mass index is 22.03 kg/m² as calculated from the following:    Height as of this encounter: 175.3 cm (69\").    Weight as of this encounter: 67.7 kg (149 lb 3.2 oz).    Vitals reviewed.   Constitutional:       Appearance: Not in distress. Cachectic and frail.   Neck:      Vascular: No JVR. JVD normal.   Pulmonary:      Effort: Pulmonary effort is normal.      Breath sounds: Normal breath sounds. No wheezing. No rhonchi. No rales.   Chest:      Chest wall: Not tender to palpatation.   Cardiovascular:      PMI at left midclavicular line. Normal rate. Regular rhythm. Normal S1. Normal S2.      Murmurs: There is no murmur.      No gallop. No click. No rub.   Pulses:     Intact distal pulses.   Edema:     Peripheral edema absent.   Abdominal:      General: Bowel sounds are normal.      Palpations: Abdomen is soft.      Tenderness: There is no abdominal tenderness.   Musculoskeletal: Normal range of motion.         General: No tenderness. Skin:     General: Skin is warm and dry.   Neurological:      General: No focal deficit present.      Mental Status: Alert and oriented to person, place and time.       Physical exam was reviewed, updated and amended when " necessary.      ECG 12 Lead    Date/Time: 5/16/2022 9:16 AM  Performed by: Eligio Abdullahi Jr., MD  Authorized by: Eligio Abdullahi Jr., MD   Comparison: compared with previous ECG from 3/3/2022  Similar to previous ECG  Rhythm: sinus rhythm  Ectopy: unifocal PVCs  Conduction: left anterior fascicular block  Other findings: left ventricular hypertrophy    Clinical impression: abnormal EKG                  ASSESSMENT:     Symptomatic bradycardia status post pacemaker implantation  Frequent ventricular ectopy  Exertional dyspnea  Tobacco abuse  History of lung cancer and chemoradiation    PLAN OF CARE:     1. Paroxysmal atrial fibrillation -previous interrogations have not been convincing for atrial fibrillation and could have easily been atrial tachycardia however most recent seems much more convincing with the extended episode of over 2 hours.  Patient does not want to incur any cost of medications such as would be potential with the DOAC so we will start him on Coumadin and have him seen in the [facility] anticoagulation clinic on Monday.  2. Frequent PVCs -patient seems asymptomatic.  Echo with normal function.  Continue beta-blocker.  3. Symptomatic bradycardia -monitor closely in pacemaker clinic.  Normal device function.  AT/AF burden noted as above.  4. Exertional dyspnea -normal left ventricular systolic function.  More likely related to pulmonary issues.  Stable symptoms.  5. Tobacco abuse -counseled on need for abstinence from nicotine products.  6. History of lung cancer and exposure to chemoradiation -echocardiogram results showed normal left ventricular systolic function no significant valvular heart disease    Return to clinic 6 months             Discharge Medications          Accurate as of May 12, 2022 11:59 PM. If you have any questions, ask your nurse or doctor.            New Medications      Instructions Start Date   warfarin 5 MG tablet  Commonly known as: COUMADIN  Started by: Eligio Abdullahi Jr, MD   5  mg, Oral, Daily Warfarin         Continue These Medications      Instructions Start Date   AMLODIPINE BESYLATE PO   2.5 mg, Oral, Daily      amLODIPine 5 MG tablet  Commonly known as: NORVASC   TAKE 1 TABLET BY MOUTH TWICE DAILY FOR HIGH BLOOD PRESSURE      aspirin 81 MG tablet   81 mg, Oral, Daily      carvedilol 6.25 MG tablet  Commonly known as: COREG   6.25 mg, Oral, 2 Times Daily With Meals      Chantix Continuing Month Chai 1 MG tablet  Generic drug: varenicline   No dose, route, or frequency recorded.      furosemide 20 MG tablet  Commonly known as: LASIX   20 mg, Oral, Daily      levothyroxine 50 MCG tablet  Commonly known as: SYNTHROID, LEVOTHROID   50 mcg, Oral, Daily      loratadine 10 MG tablet  Commonly known as: CLARITIN   10 mg, Oral, Daily      losartan 50 MG tablet  Commonly known as: COZAAR   50 mg, Oral, 2 Times Daily      meloxicam 15 MG tablet  Commonly known as: MOBIC   15 mg, Oral, Daily      methylPREDNISolone 4 MG dose pack  Commonly known as: MEDROL   Take as directed on package instructions.      oxyCODONE-acetaminophen 5-325 MG per tablet  Commonly known as: PERCOCET   1-2 tablets, Oral, Every 4 Hours PRN      pravastatin 20 MG tablet  Commonly known as: PRAVACHOL   20 mg, Oral, Nightly             Thank you for allowing me to participate in the care of your patient,      Sincerely,   Eligio Abdullahi Jr, MD  [facility]  05/16/22  09:13 EDT

## 2022-07-13 ENCOUNTER — TELEPHONE (OUTPATIENT)
Dept: URGENT CARE | Facility: CLINIC | Age: 77
End: 2022-07-13

## 2022-07-30 ENCOUNTER — APPOINTMENT (OUTPATIENT)
Dept: GENERAL RADIOLOGY | Facility: HOSPITAL | Age: 77
End: 2022-07-30

## 2022-07-30 ENCOUNTER — HOSPITAL ENCOUNTER (EMERGENCY)
Facility: HOSPITAL | Age: 77
Discharge: ANOTHER HOSPITAL | End: 2022-07-30
Attending: EMERGENCY MEDICINE | Admitting: EMERGENCY MEDICINE

## 2022-07-30 VITALS
HEART RATE: 60 BPM | HEIGHT: 64 IN | TEMPERATURE: 98.5 F | WEIGHT: 141.9 LBS | DIASTOLIC BLOOD PRESSURE: 65 MMHG | BODY MASS INDEX: 24.22 KG/M2 | RESPIRATION RATE: 24 BRPM | SYSTOLIC BLOOD PRESSURE: 137 MMHG | OXYGEN SATURATION: 96 %

## 2022-07-30 DIAGNOSIS — J18.9 PNEUMONIA OF RIGHT UPPER LOBE DUE TO INFECTIOUS ORGANISM: Primary | ICD-10-CM

## 2022-07-30 LAB
ALBUMIN SERPL-MCNC: 3.7 G/DL (ref 3.5–5.2)
ALBUMIN/GLOB SERPL: 1.3 G/DL
ALP SERPL-CCNC: 107 U/L (ref 39–117)
ALT SERPL W P-5'-P-CCNC: 8 U/L (ref 1–41)
ANION GAP SERPL CALCULATED.3IONS-SCNC: 9.5 MMOL/L (ref 5–15)
AST SERPL-CCNC: 14 U/L (ref 1–40)
BASOPHILS # BLD AUTO: 0.04 10*3/MM3 (ref 0–0.2)
BASOPHILS NFR BLD AUTO: 0.4 % (ref 0–1.5)
BILIRUB SERPL-MCNC: 0.6 MG/DL (ref 0–1.2)
BUN SERPL-MCNC: 21 MG/DL (ref 8–23)
BUN/CREAT SERPL: 16.4 (ref 7–25)
CALCIUM SPEC-SCNC: 9.3 MG/DL (ref 8.6–10.5)
CHLORIDE SERPL-SCNC: 100 MMOL/L (ref 98–107)
CO2 SERPL-SCNC: 24.5 MMOL/L (ref 22–29)
CREAT SERPL-MCNC: 1.28 MG/DL (ref 0.76–1.27)
D-LACTATE SERPL-SCNC: 1.1 MMOL/L (ref 0.5–2)
DEPRECATED RDW RBC AUTO: 44.6 FL (ref 37–54)
EGFRCR SERPLBLD CKD-EPI 2021: 56.9 ML/MIN/1.73
EOSINOPHIL # BLD AUTO: 0.08 10*3/MM3 (ref 0–0.4)
EOSINOPHIL NFR BLD AUTO: 0.8 % (ref 0.3–6.2)
ERYTHROCYTE [DISTWIDTH] IN BLOOD BY AUTOMATED COUNT: 13.7 % (ref 12.3–15.4)
GLOBULIN UR ELPH-MCNC: 2.9 GM/DL
GLUCOSE SERPL-MCNC: 95 MG/DL (ref 65–99)
HCT VFR BLD AUTO: 34.5 % (ref 37.5–51)
HGB BLD-MCNC: 11.7 G/DL (ref 13–17.7)
HOLD SPECIMEN: NORMAL
HOLD SPECIMEN: NORMAL
IMM GRANULOCYTES # BLD AUTO: 0.05 10*3/MM3 (ref 0–0.05)
IMM GRANULOCYTES NFR BLD AUTO: 0.5 % (ref 0–0.5)
INR PPP: 2.32 (ref 0.9–1.1)
LYMPHOCYTES # BLD AUTO: 1.11 10*3/MM3 (ref 0.7–3.1)
LYMPHOCYTES NFR BLD AUTO: 10.9 % (ref 19.6–45.3)
MCH RBC QN AUTO: 30.1 PG (ref 26.6–33)
MCHC RBC AUTO-ENTMCNC: 33.9 G/DL (ref 31.5–35.7)
MCV RBC AUTO: 88.7 FL (ref 79–97)
MONOCYTES # BLD AUTO: 0.98 10*3/MM3 (ref 0.1–0.9)
MONOCYTES NFR BLD AUTO: 9.7 % (ref 5–12)
NEUTROPHILS NFR BLD AUTO: 7.88 10*3/MM3 (ref 1.7–7)
NEUTROPHILS NFR BLD AUTO: 77.7 % (ref 42.7–76)
NRBC BLD AUTO-RTO: 0 /100 WBC (ref 0–0.2)
PLATELET # BLD AUTO: 180 10*3/MM3 (ref 140–450)
PMV BLD AUTO: 10.4 FL (ref 6–12)
POTASSIUM SERPL-SCNC: 4.3 MMOL/L (ref 3.5–5.2)
PROCALCITONIN SERPL-MCNC: 0.06 NG/ML (ref 0–0.25)
PROT SERPL-MCNC: 6.6 G/DL (ref 6–8.5)
PROTHROMBIN TIME: 25.9 SECONDS (ref 12.1–15)
RBC # BLD AUTO: 3.89 10*6/MM3 (ref 4.14–5.8)
SODIUM SERPL-SCNC: 134 MMOL/L (ref 136–145)
TROPONIN T SERPL-MCNC: <0.01 NG/ML (ref 0–0.03)
WBC NRBC COR # BLD: 10.14 10*3/MM3 (ref 3.4–10.8)
WHOLE BLOOD HOLD COAG: NORMAL
WHOLE BLOOD HOLD SPECIMEN: NORMAL

## 2022-07-30 PROCEDURE — 84145 PROCALCITONIN (PCT): CPT | Performed by: EMERGENCY MEDICINE

## 2022-07-30 PROCEDURE — 80053 COMPREHEN METABOLIC PANEL: CPT | Performed by: EMERGENCY MEDICINE

## 2022-07-30 PROCEDURE — 84484 ASSAY OF TROPONIN QUANT: CPT | Performed by: EMERGENCY MEDICINE

## 2022-07-30 PROCEDURE — 87040 BLOOD CULTURE FOR BACTERIA: CPT | Performed by: EMERGENCY MEDICINE

## 2022-07-30 PROCEDURE — 99284 EMERGENCY DEPT VISIT MOD MDM: CPT

## 2022-07-30 PROCEDURE — 71046 X-RAY EXAM CHEST 2 VIEWS: CPT

## 2022-07-30 PROCEDURE — 93005 ELECTROCARDIOGRAM TRACING: CPT | Performed by: EMERGENCY MEDICINE

## 2022-07-30 PROCEDURE — 36415 COLL VENOUS BLD VENIPUNCTURE: CPT

## 2022-07-30 PROCEDURE — 93005 ELECTROCARDIOGRAM TRACING: CPT

## 2022-07-30 PROCEDURE — 93010 ELECTROCARDIOGRAM REPORT: CPT | Performed by: INTERNAL MEDICINE

## 2022-07-30 PROCEDURE — 85025 COMPLETE CBC W/AUTO DIFF WBC: CPT | Performed by: EMERGENCY MEDICINE

## 2022-07-30 PROCEDURE — 85610 PROTHROMBIN TIME: CPT | Performed by: EMERGENCY MEDICINE

## 2022-07-30 PROCEDURE — 83605 ASSAY OF LACTIC ACID: CPT | Performed by: EMERGENCY MEDICINE

## 2022-07-30 RX ORDER — CEFDINIR 300 MG/1
300 CAPSULE ORAL 2 TIMES DAILY
Qty: 20 CAPSULE | Refills: 0 | Status: SHIPPED | OUTPATIENT
Start: 2022-07-30 | End: 2022-08-09

## 2022-07-30 RX ORDER — CEFDINIR 300 MG/1
300 CAPSULE ORAL ONCE
Status: COMPLETED | OUTPATIENT
Start: 2022-07-30 | End: 2022-07-30

## 2022-07-30 RX ADMIN — CEFDINIR 300 MG: 300 CAPSULE ORAL at 18:49

## 2022-07-31 LAB — QT INTERVAL: 390 MS

## 2022-08-04 LAB
BACTERIA SPEC AEROBE CULT: NORMAL
BACTERIA SPEC AEROBE CULT: NORMAL

## 2023-01-27 PROCEDURE — 93296 REM INTERROG EVL PM/IDS: CPT | Performed by: INTERNAL MEDICINE

## 2023-01-27 PROCEDURE — 93294 REM INTERROG EVL PM/LDLS PM: CPT | Performed by: INTERNAL MEDICINE

## 2023-02-14 ENCOUNTER — OFFICE VISIT (OUTPATIENT)
Dept: CARDIOLOGY | Facility: CLINIC | Age: 78
End: 2023-02-14
Payer: MEDICARE

## 2023-02-14 ENCOUNTER — CLINICAL SUPPORT NO REQUIREMENTS (OUTPATIENT)
Dept: CARDIOLOGY | Facility: CLINIC | Age: 78
End: 2023-02-14
Payer: MEDICARE

## 2023-02-14 VITALS
WEIGHT: 150 LBS | HEIGHT: 64 IN | DIASTOLIC BLOOD PRESSURE: 78 MMHG | HEART RATE: 59 BPM | BODY MASS INDEX: 25.61 KG/M2 | OXYGEN SATURATION: 98 % | SYSTOLIC BLOOD PRESSURE: 134 MMHG

## 2023-02-14 DIAGNOSIS — I48.0 PAROXYSMAL ATRIAL FIBRILLATION: ICD-10-CM

## 2023-02-14 DIAGNOSIS — I49.5 SICK SINUS SYNDROME: Primary | ICD-10-CM

## 2023-02-14 PROCEDURE — 99213 OFFICE O/P EST LOW 20 MIN: CPT | Performed by: INTERNAL MEDICINE

## 2023-02-14 PROCEDURE — 93280 PM DEVICE PROGR EVAL DUAL: CPT | Performed by: INTERNAL MEDICINE

## 2023-02-14 NOTE — PROGRESS NOTES
[facility]      Patient Name: Vinod Cardenas  :1945  Age: 77 y.o.  Encounter Provider:  Eligio Abdullahi Jr, MD      Chief Complaint: Follow-up for evaluation and management of chronic medical issues      HPI  Vinod Cardenas is a 77 y.o. male past medical history of nonobstructive coronary artery disease, lung cancer status post chemotherapy and radiation treatment currently in remission, ongoing tobacco abuse and hypertension who presents for follow-up with me.     Summary of clinic visitation: Last time he was seen was in 2017 at which time he was having symptomatic bradycardia.  Pacemaker placed without complication patient has no further issues with dizziness.  Continues to have exertional dyspnea.  He denies chest pain or palpitations.  No syncope but complains of balance issues.  He is noted to have frequent ventricular ectopy on recent device interrogation.  As stated above he continues to smoke cigarettes.  He denies alcohol or illicit drug use.     Recent device interrogations have shown high atrial rates with the most recent showing more convincing evidence for atrial fibrillation.  The longest incident was over 2 hours however his total burden is less than 1%.  He is relatively asymptomatic from this and continues to perform activities of daily living without limiting symptoms.  He denies chest pain or shortness of air.  No palpitations, dizziness syncope.  No orthopnea, PND or edema.  He has chronic stable dyspnea on exertion.  He is tolerating all other medications well.  [id number] score of 4.    Doing fairly well since last visit.  Unfortunately continues to smoke cigarettes.  He was admitted in 2022 for pneumonia/flu but is recovered well from that.  Device interrogation today shows normal device function with 3-1/2 years battery life.  He has some high atrial rates which is unchanged.  He is asymptomatic from this.  He denies chest pain or shortness of air  "with activity.  Blood pressure and heart rate are well controlled today in clinic.    The following portions of the patient's history were reviewed and updated as appropriate: allergies, current medications, past family history, past medical history, past social history, past surgical history and problem list.      Review of Systems   Constitutional: Negative for chills and fever.   HENT: Negative for hoarse voice and sore throat.    Eyes: Negative for double vision and photophobia.   Cardiovascular: Positive for dyspnea on exertion. Negative for chest pain, leg swelling, near-syncope, orthopnea, palpitations, paroxysmal nocturnal dyspnea and syncope.   Respiratory: Negative for cough and wheezing.    Skin: Negative for poor wound healing and rash.   Musculoskeletal: Negative for arthritis and joint swelling.   Gastrointestinal: Negative for bloating, abdominal pain, hematemesis and hematochezia.   Neurological: Positive for disturbances in coordination and loss of balance. Negative for dizziness and focal weakness.   Psychiatric/Behavioral: Negative for depression and suicidal ideas.     ROS was reviewed, updated and amended when necessary.    OBJECTIVE:   Vital Signs  Vitals:    02/14/23 1308   BP: 134/78   Pulse: 59   SpO2: 98%     Estimated body mass index is 25.75 kg/m² as calculated from the following:    Height as of this encounter: 162.6 cm (64\").    Weight as of this encounter: 68 kg (150 lb).    Vitals reviewed.   Constitutional:       Appearance: Not in distress. Cachectic and frail.   Neck:      Vascular: No JVR. JVD normal.   Pulmonary:      Effort: Pulmonary effort is normal.      Breath sounds: Normal breath sounds. No wheezing. No rhonchi. No rales.   Chest:      Chest wall: Not tender to palpatation.   Cardiovascular:      PMI at left midclavicular line. Normal rate. Regular rhythm. Normal S1. Normal S2.      Murmurs: There is no murmur.      No gallop. No click. No rub.   Pulses:     Intact distal " pulses.   Edema:     Peripheral edema absent.   Abdominal:      General: Bowel sounds are normal.      Palpations: Abdomen is soft.      Tenderness: There is no abdominal tenderness.   Musculoskeletal: Normal range of motion.         General: No tenderness. Skin:     General: Skin is warm and dry.   Neurological:      General: No focal deficit present.      Mental Status: Alert and oriented to person, place and time.       Physical exam was reviewed, updated and amended when necessary.    Procedures          ASSESSMENT:     Symptomatic bradycardia status post pacemaker implantation  Frequent ventricular ectopy  Exertional dyspnea  Tobacco abuse  History of lung cancer and chemoradiation    PLAN OF CARE:     1. Paroxysmal atrial fibrillation -continue carvedilol and warfarin.  He will inquire about home INR monitor at [facility] anticoagulation clinic.  Last INR 2.5.  2. Frequent PVCs -patient seems asymptomatic.  Echo with normal function.  Continue beta-blocker.  3. Symptomatic bradycardia -monitor closely in pacemaker clinic.  Normal device function.  AT/AF burden noted as above.  4. Exertional dyspnea -normal left ventricular systolic function.  More likely related to pulmonary issues.  Stable symptoms.  5. Tobacco abuse -counseled on need for abstinence from nicotine products.  6. History of lung cancer and exposure to chemoradiation -echocardiogram results showed normal left ventricular systolic function no significant valvular heart disease    Return to clinic 12 months             Discharge Medications          Accurate as of February 14, 2023  1:10 PM. If you have any questions, ask your nurse or doctor.            Changes to Medications      Instructions Start Date   warfarin 5 MG tablet  Commonly known as: COUMADIN  What changed: additional instructions   5 mg, Oral, Daily Warfarin         Continue These Medications      Instructions Start Date   AMLODIPINE BESYLATE PO   2.5 mg, Oral, Daily      amLODIPine 5 MG  tablet  Commonly known as: NORVASC   TAKE 1 TABLET BY MOUTH TWICE DAILY FOR HIGH BLOOD PRESSURE      aspirin 81 MG tablet   81 mg, Oral, Daily      carvedilol 6.25 MG tablet  Commonly known as: COREG   6.25 mg, Oral, 2 Times Daily With Meals      Chantix Continuing Month Chai 1 MG tablet  Generic drug: varenicline   No dose, route, or frequency recorded.      furosemide 20 MG tablet  Commonly known as: LASIX   20 mg, Oral, As Needed      levothyroxine 50 MCG tablet  Commonly known as: SYNTHROID, LEVOTHROID   50 mcg, Oral, Daily      loratadine 10 MG tablet  Commonly known as: CLARITIN   10 mg, Oral, Daily      losartan 50 MG tablet  Commonly known as: COZAAR   50 mg, Oral, 2 Times Daily      meloxicam 15 MG tablet  Commonly known as: MOBIC   15 mg, Oral, Daily PRN      methylPREDNISolone 4 MG dose pack  Commonly known as: MEDROL   Take as directed on package instructions.      oxyCODONE-acetaminophen 5-325 MG per tablet  Commonly known as: PERCOCET   1-2 tablets, Oral, Every 4 Hours PRN      pravastatin 20 MG tablet  Commonly known as: PRAVACHOL   20 mg, Oral, Nightly             Thank you for allowing me to participate in the care of your patient,      Sincerely,   Eligio Abdullahi MD  [facility]  02/14/23  13:10 EST

## 2023-04-28 PROCEDURE — 93294 REM INTERROG EVL PM/LDLS PM: CPT | Performed by: INTERNAL MEDICINE

## 2023-04-28 PROCEDURE — 93296 REM INTERROG EVL PM/IDS: CPT | Performed by: INTERNAL MEDICINE

## 2023-07-28 PROCEDURE — 93296 REM INTERROG EVL PM/IDS: CPT | Performed by: INTERNAL MEDICINE

## 2023-07-28 PROCEDURE — 93294 REM INTERROG EVL PM/LDLS PM: CPT | Performed by: INTERNAL MEDICINE

## 2023-09-06 RX ORDER — WARFARIN SODIUM 5 MG/1
TABLET ORAL
Qty: 30 TABLET | Refills: 0 | OUTPATIENT
Start: 2023-09-06

## 2023-09-07 RX ORDER — WARFARIN SODIUM 5 MG/1
TABLET ORAL
Qty: 30 TABLET | Refills: 0 | OUTPATIENT
Start: 2023-09-07

## 2024-04-18 ENCOUNTER — OFFICE VISIT (OUTPATIENT)
Dept: CARDIOLOGY | Facility: CLINIC | Age: 79
End: 2024-04-18
Payer: MEDICARE

## 2024-04-18 ENCOUNTER — CLINICAL SUPPORT NO REQUIREMENTS (OUTPATIENT)
Age: 79
End: 2024-04-18
Payer: MEDICARE

## 2024-04-18 VITALS
DIASTOLIC BLOOD PRESSURE: 80 MMHG | HEART RATE: 57 BPM | SYSTOLIC BLOOD PRESSURE: 150 MMHG | BODY MASS INDEX: 24.72 KG/M2 | WEIGHT: 148.4 LBS | HEIGHT: 65 IN

## 2024-04-18 DIAGNOSIS — Z95.0 PACEMAKER: ICD-10-CM

## 2024-04-18 DIAGNOSIS — I48.0 PAROXYSMAL ATRIAL FIBRILLATION: Primary | ICD-10-CM

## 2024-04-18 DIAGNOSIS — I49.5 SSS (SICK SINUS SYNDROME): Primary | ICD-10-CM

## 2024-04-18 DIAGNOSIS — R00.1 SYMPTOMATIC BRADYCARDIA: ICD-10-CM

## 2024-04-18 DIAGNOSIS — I49.3 FREQUENT PVCS: ICD-10-CM

## 2024-04-18 NOTE — PROGRESS NOTES
[facility]      Patient Name: Vinod Cardenas  :1945  Age: 79 y.o.  Encounter Provider:  Eligio Abdullahi Jr, MD      Chief Complaint: Follow-up for evaluation and management of chronic medical issues      HPI  Vinod Cardenas is a 79 y.o. male past medical history of nonobstructive coronary artery disease, lung cancer status post chemotherapy and radiation treatment currently in remission, ongoing tobacco abuse and hypertension who presents for follow-up with me.     Summary of clinic visitation: Last time he was seen was in 2017 at which time he was having symptomatic bradycardia.  Pacemaker placed without complication patient has no further issues with dizziness.  Continues to have exertional dyspnea.  He denies chest pain or palpitations.  No syncope but complains of balance issues.  He is noted to have frequent ventricular ectopy on recent device interrogation.  As stated above he continues to smoke cigarettes.  He denies alcohol or illicit drug use.     Recent device interrogations have shown high atrial rates with the most recent showing more convincing evidence for atrial fibrillation.  The longest incident was over 2 hours however his total burden is less than 1%.  He is relatively asymptomatic from this and continues to perform activities of daily living without limiting symptoms.  He denies chest pain or shortness of air.  No palpitations, dizziness syncope.  No orthopnea, PND or edema.  He has chronic stable dyspnea on exertion.  He is tolerating all other medications well.  [id number] score of 4.    Doing fairly well since last visit.  Unfortunately continues to smoke cigarettes.  He has fair functional capacity with no limiting symptoms.  He denies orthopnea, PND or edema.  No palpitations, dizziness or syncope.  Blood pressure is elevated today in clinic but he states there was a lot of traffic on his way down from the [facility] and he was rushing to get up to  "clinic.  He does have a blood pressure monitor at home.  No cardiac complaints at time of interview.  No bleeding complications on warfarin.  Last INR at [facility] was 1.9 and Coumadin was adjusted.    The following portions of the patient's history were reviewed and updated as appropriate: allergies, current medications, past family history, past medical history, past social history, past surgical history and problem list.      Review of Systems   Constitutional: Negative for chills and fever.   HENT:  Negative for hoarse voice and sore throat.    Eyes:  Negative for double vision and photophobia.   Cardiovascular:  Positive for dyspnea on exertion. Negative for chest pain, leg swelling, near-syncope, orthopnea, palpitations, paroxysmal nocturnal dyspnea and syncope.   Respiratory:  Negative for cough and wheezing.    Skin:  Negative for poor wound healing and rash.   Musculoskeletal:  Negative for arthritis and joint swelling.   Gastrointestinal:  Negative for bloating, abdominal pain, hematemesis and hematochezia.   Neurological:  Positive for disturbances in coordination and loss of balance. Negative for dizziness and focal weakness.   Psychiatric/Behavioral:  Negative for depression and suicidal ideas.      ROS was reviewed, updated and amended when necessary.    OBJECTIVE:   Vital Signs  Vitals:    04/18/24 0921   BP: 150/80   Pulse: 57     Estimated body mass index is 24.7 kg/m² as calculated from the following:    Height as of this encounter: 165.1 cm (65\").    Weight as of this encounter: 67.3 kg (148 lb 6.4 oz).    Vitals reviewed.   Constitutional:       Appearance: Not in distress. Cachectic and frail.   Neck:      Vascular: No JVR. JVD normal.   Pulmonary:      Effort: Pulmonary effort is normal.      Breath sounds: Normal breath sounds. No wheezing. No rhonchi. No rales.   Chest:      Chest wall: Not tender to palpatation.   Cardiovascular:      PMI at left midclavicular line. Normal rate. " Regular rhythm. Normal S1. Normal S2.       Murmurs: There is no murmur.      No gallop.  No click. No rub.   Pulses:     Intact distal pulses.   Edema:     Peripheral edema absent.   Abdominal:      General: Bowel sounds are normal.      Palpations: Abdomen is soft.      Tenderness: There is no abdominal tenderness.   Musculoskeletal: Normal range of motion.         General: No tenderness. Skin:     General: Skin is warm and dry.   Neurological:      General: No focal deficit present.      Mental Status: Alert and oriented to person, place and time.     Physical exam was reviewed, updated and amended when necessary.      ECG 12 Lead    Date/Time: 4/18/2024 9:22 AM  Performed by: Eligio Abdullahi Jr., MD    Authorized by: Eligio Abdullahi Jr., MD  Comparison: compared with previous ECG from 7/31/2022  Similar to previous ECG  Rhythm: paced  Ectopy: unifocal PVCs  Conduction: right bundle branch block and left anterior fascicular block    Clinical impression: abnormal EKG            ASSESSMENT:     Symptomatic bradycardia status post pacemaker implantation  Frequent ventricular ectopy  Exertional dyspnea  Tobacco abuse  History of lung cancer and chemoradiation    PLAN OF CARE:     Paroxysmal atrial fibrillation -continue carvedilol and warfarin.  He will inquire about home INR monitor at [facility] anticoagulation clinic.  Last INR 1.9 with medications adjusted.  Frequent PVCs -patient seems asymptomatic.  Echo with normal function.  Continue beta-blocker.  Symptomatic bradycardia -monitor closely in pacemaker clinic.  Normal device function.  AT/AF burden noted as above.  Exertional dyspnea -normal left ventricular systolic function.  More likely related to pulmonary issues.  Stable symptoms.  Tobacco abuse -counseled on need for abstinence from nicotine products.  History of lung cancer and exposure to chemoradiation -echocardiogram results showed normal left ventricular systolic function no significant valvular heart  disease    Return to clinic 12 months             Discharge Medications            Accurate as of April 18, 2024  9:22 AM. If you have any questions, ask your nurse or doctor.                Changes to Medications        Instructions Start Date   warfarin 5 MG tablet  Commonly known as: COUMADIN  What changed: additional instructions   5 mg, Oral, Daily Warfarin             Continue These Medications        Instructions Start Date   AMLODIPINE BESYLATE PO   2.5 mg, Daily      amLODIPine 5 MG tablet  Commonly known as: NORVASC   TAKE 1 TABLET BY MOUTH TWICE DAILY FOR HIGH BLOOD PRESSURE      aspirin 81 MG tablet   81 mg, Daily      carvedilol 6.25 MG tablet  Commonly known as: COREG   6.25 mg, Oral, 2 Times Daily With Meals      Chantix Continuing Month Chai 1 MG tablet  Generic drug: varenicline   No dose, route, or frequency recorded.      furosemide 20 MG tablet  Commonly known as: LASIX   20 mg, Oral, As Needed      levothyroxine 50 MCG tablet  Commonly known as: SYNTHROID, LEVOTHROID   50 mcg, Oral, Daily      loratadine 10 MG tablet  Commonly known as: CLARITIN   10 mg, Daily      losartan 50 MG tablet  Commonly known as: COZAAR   50 mg, 2 Times Daily      meloxicam 15 MG tablet  Commonly known as: MOBIC   15 mg, Daily PRN      methylPREDNISolone 4 MG dose pack  Commonly known as: MEDROL   Take as directed on package instructions.      oxyCODONE-acetaminophen 5-325 MG per tablet  Commonly known as: PERCOCET   1-2 tablets, Oral, Every 4 Hours PRN      pravastatin 20 MG tablet  Commonly known as: PRAVACHOL   20 mg, Nightly               Thank you for allowing me to participate in the care of your patient,      Sincerely,   Eligio Abdullahi MD  [facility]  04/18/24  09:22 EDT

## 2024-05-27 ENCOUNTER — APPOINTMENT (OUTPATIENT)
Dept: GENERAL RADIOLOGY | Facility: HOSPITAL | Age: 79
End: 2024-05-27
Payer: MEDICARE

## 2024-05-27 ENCOUNTER — HOSPITAL ENCOUNTER (EMERGENCY)
Facility: HOSPITAL | Age: 79
Discharge: HOME | End: 2024-05-27
Attending: EMERGENCY MEDICINE | Admitting: STUDENT IN AN ORGANIZED HEALTH CARE EDUCATION/TRAINING PROGRAM
Payer: MEDICARE

## 2024-05-27 ENCOUNTER — APPOINTMENT (OUTPATIENT)
Dept: CT IMAGING | Facility: HOSPITAL | Age: 79
End: 2024-05-27
Payer: MEDICARE

## 2024-05-27 VITALS
RESPIRATION RATE: 20 BRPM | TEMPERATURE: 97.4 F | OXYGEN SATURATION: 93 % | SYSTOLIC BLOOD PRESSURE: 138 MMHG | HEART RATE: 90 BPM | DIASTOLIC BLOOD PRESSURE: 65 MMHG | WEIGHT: 149 LBS | HEIGHT: 65 IN | BODY MASS INDEX: 24.83 KG/M2

## 2024-05-27 DIAGNOSIS — R05.2 SUBACUTE COUGH: Primary | ICD-10-CM

## 2024-05-27 DIAGNOSIS — R11.2 NAUSEA AND VOMITING, UNSPECIFIED VOMITING TYPE: ICD-10-CM

## 2024-05-27 LAB
ALBUMIN SERPL-MCNC: 4.3 G/DL (ref 3.5–5.2)
ALBUMIN/GLOB SERPL: 1.4 G/DL
ALP SERPL-CCNC: 98 U/L (ref 39–117)
ALT SERPL W P-5'-P-CCNC: 15 U/L (ref 1–41)
ANION GAP SERPL CALCULATED.3IONS-SCNC: 12 MMOL/L (ref 5–15)
AST SERPL-CCNC: 36 U/L (ref 1–40)
BASOPHILS # BLD AUTO: 0.01 10*3/MM3 (ref 0–0.2)
BASOPHILS NFR BLD AUTO: 0.3 % (ref 0–1.5)
BILIRUB SERPL-MCNC: 0.6 MG/DL (ref 0–1.2)
BUN SERPL-MCNC: 19 MG/DL (ref 8–23)
BUN/CREAT SERPL: 15 (ref 7–25)
CALCIUM SPEC-SCNC: 9.3 MG/DL (ref 8.6–10.5)
CHLORIDE SERPL-SCNC: 102 MMOL/L (ref 98–107)
CO2 SERPL-SCNC: 23 MMOL/L (ref 22–29)
CREAT SERPL-MCNC: 1.27 MG/DL (ref 0.76–1.27)
DEPRECATED RDW RBC AUTO: 46.7 FL (ref 37–54)
EGFRCR SERPLBLD CKD-EPI 2021: 56.8 ML/MIN/1.73
EOSINOPHIL # BLD AUTO: 0.02 10*3/MM3 (ref 0–0.4)
EOSINOPHIL NFR BLD AUTO: 0.6 % (ref 0.3–6.2)
ERYTHROCYTE [DISTWIDTH] IN BLOOD BY AUTOMATED COUNT: 14.6 % (ref 12.3–15.4)
GEN 5 2HR TROPONIN T REFLEX: 24 NG/L
GLOBULIN UR ELPH-MCNC: 3 GM/DL
GLUCOSE SERPL-MCNC: 93 MG/DL (ref 65–99)
HCT VFR BLD AUTO: 43.2 % (ref 37.5–51)
HGB BLD-MCNC: 14.8 G/DL (ref 13–17.7)
HOLD SPECIMEN: NORMAL
HOLD SPECIMEN: NORMAL
IMM GRANULOCYTES # BLD AUTO: 0.03 10*3/MM3 (ref 0–0.05)
IMM GRANULOCYTES NFR BLD AUTO: 0.8 % (ref 0–0.5)
INR PPP: 3.27 (ref 0.9–1.1)
LARGE PLATELETS: NORMAL
LIPASE SERPL-CCNC: 62 U/L (ref 13–60)
LYMPHOCYTES # BLD AUTO: 0.84 10*3/MM3 (ref 0.7–3.1)
LYMPHOCYTES NFR BLD AUTO: 23.1 % (ref 19.6–45.3)
MCH RBC QN AUTO: 29.7 PG (ref 26.6–33)
MCHC RBC AUTO-ENTMCNC: 34.3 G/DL (ref 31.5–35.7)
MCV RBC AUTO: 86.7 FL (ref 79–97)
MONOCYTES # BLD AUTO: 0.5 10*3/MM3 (ref 0.1–0.9)
MONOCYTES NFR BLD AUTO: 13.8 % (ref 5–12)
NEUTROPHILS NFR BLD AUTO: 2.23 10*3/MM3 (ref 1.7–7)
NEUTROPHILS NFR BLD AUTO: 61.4 % (ref 42.7–76)
NRBC BLD AUTO-RTO: 0 /100 WBC (ref 0–0.2)
NT-PROBNP SERPL-MCNC: 417 PG/ML (ref 0–1800)
PLATELET # BLD AUTO: 109 10*3/MM3 (ref 140–450)
PMV BLD AUTO: 11.8 FL (ref 6–12)
POTASSIUM SERPL-SCNC: 4 MMOL/L (ref 3.5–5.2)
PROT SERPL-MCNC: 7.3 G/DL (ref 6–8.5)
PROTHROMBIN TIME: 32.6 SECONDS (ref 12.1–15)
RBC # BLD AUTO: 4.98 10*6/MM3 (ref 4.14–5.8)
RBC MORPH BLD: NORMAL
SODIUM SERPL-SCNC: 137 MMOL/L (ref 136–145)
TROPONIN T DELTA: -1 NG/L
TROPONIN T SERPL HS-MCNC: 25 NG/L
WBC MORPH BLD: NORMAL
WBC NRBC COR # BLD AUTO: 3.63 10*3/MM3 (ref 3.4–10.8)
WHOLE BLOOD HOLD COAG: NORMAL
WHOLE BLOOD HOLD SPECIMEN: NORMAL

## 2024-05-27 PROCEDURE — 71260 CT THORAX DX C+: CPT

## 2024-05-27 PROCEDURE — 36415 COLL VENOUS BLD VENIPUNCTURE: CPT

## 2024-05-27 PROCEDURE — 83690 ASSAY OF LIPASE: CPT | Performed by: PHYSICIAN ASSISTANT

## 2024-05-27 PROCEDURE — 25510000001 IOPAMIDOL PER 1 ML: Performed by: STUDENT IN AN ORGANIZED HEALTH CARE EDUCATION/TRAINING PROGRAM

## 2024-05-27 PROCEDURE — 85025 COMPLETE CBC W/AUTO DIFF WBC: CPT | Performed by: PHYSICIAN ASSISTANT

## 2024-05-27 PROCEDURE — 99285 EMERGENCY DEPT VISIT HI MDM: CPT

## 2024-05-27 PROCEDURE — 84484 ASSAY OF TROPONIN QUANT: CPT | Performed by: PHYSICIAN ASSISTANT

## 2024-05-27 PROCEDURE — 25810000003 LACTATED RINGERS SOLUTION: Performed by: PHYSICIAN ASSISTANT

## 2024-05-27 PROCEDURE — 80053 COMPREHEN METABOLIC PANEL: CPT | Performed by: PHYSICIAN ASSISTANT

## 2024-05-27 PROCEDURE — 25010000002 ONDANSETRON PER 1 MG: Performed by: PHYSICIAN ASSISTANT

## 2024-05-27 PROCEDURE — 83880 ASSAY OF NATRIURETIC PEPTIDE: CPT | Performed by: PHYSICIAN ASSISTANT

## 2024-05-27 PROCEDURE — 71045 X-RAY EXAM CHEST 1 VIEW: CPT

## 2024-05-27 PROCEDURE — 85610 PROTHROMBIN TIME: CPT | Performed by: PHYSICIAN ASSISTANT

## 2024-05-27 PROCEDURE — 96374 THER/PROPH/DIAG INJ IV PUSH: CPT

## 2024-05-27 PROCEDURE — 74177 CT ABD & PELVIS W/CONTRAST: CPT

## 2024-05-27 PROCEDURE — 85007 BL SMEAR W/DIFF WBC COUNT: CPT | Performed by: PHYSICIAN ASSISTANT

## 2024-05-27 RX ORDER — SODIUM CHLORIDE 0.9 % (FLUSH) 0.9 %
10 SYRINGE (ML) INJECTION AS NEEDED
Status: DISCONTINUED | OUTPATIENT
Start: 2024-05-27 | End: 2024-05-27 | Stop reason: HOSPADM

## 2024-05-27 RX ORDER — BENZONATATE 100 MG/1
100 CAPSULE ORAL ONCE
Status: COMPLETED | OUTPATIENT
Start: 2024-05-27 | End: 2024-05-27

## 2024-05-27 RX ORDER — ONDANSETRON 2 MG/ML
4 INJECTION INTRAMUSCULAR; INTRAVENOUS ONCE
Status: COMPLETED | OUTPATIENT
Start: 2024-05-27 | End: 2024-05-27

## 2024-05-27 RX ORDER — ONDANSETRON 4 MG/1
4 TABLET, ORALLY DISINTEGRATING ORAL EVERY 8 HOURS PRN
Qty: 10 TABLET | Refills: 0 | Status: SHIPPED | OUTPATIENT
Start: 2024-05-27

## 2024-05-27 RX ORDER — BENZONATATE 100 MG/1
100 CAPSULE ORAL 3 TIMES DAILY PRN
Qty: 15 CAPSULE | Refills: 0 | Status: SHIPPED | OUTPATIENT
Start: 2024-05-27 | End: 2024-06-01

## 2024-05-27 RX ADMIN — IOPAMIDOL 100 ML: 755 INJECTION, SOLUTION INTRAVENOUS at 15:42

## 2024-05-27 RX ADMIN — SODIUM CHLORIDE, POTASSIUM CHLORIDE, SODIUM LACTATE AND CALCIUM CHLORIDE 500 ML: 600; 310; 30; 20 INJECTION, SOLUTION INTRAVENOUS at 14:54

## 2024-05-27 RX ADMIN — ONDANSETRON 4 MG: 2 INJECTION INTRAMUSCULAR; INTRAVENOUS at 14:53

## 2024-05-27 RX ADMIN — BENZONATATE 100 MG: 100 CAPSULE ORAL at 16:30

## 2024-05-27 NOTE — ED PROVIDER NOTES
EMERGENCY DEPARTMENT ENCOUNTER      Room Number: 07/07    History is provided by the patient, no translation services needed    HPI:    Chief complaint: vomiting     Narrative: Pt is a 79 y.o. male who presents complaining of vomiting and cough.  Patient reports that he has a history of lung cancer and his doctor ordered a PET scan for him about 2 weeks ago.  Since then he has been having persistent vomiting and a cough.  States that they want him to have a biopsy performed but he is unwilling to schedule this until he can start to feel better and tolerate p.o. intake.  States that he has not had any fevers.  He denies any diarrhea.  No blood in the emesis.  Reports he has been trying to eat for the past 4 days but has vomited everything up.  No previous abdominal surgeries.  No chest pain no other acute complaints at this time.      PMD: Kelsy Clay MD    REVIEW OF SYSTEMS  Review of Systems  Complete review of systems performed otherwise negative except pertinent positives per HPI.    PAST MEDICAL HISTORY  Active Ambulatory Problems     Diagnosis Date Noted    Arteriosclerosis of coronary artery 02/11/2016    Breath shortness 02/11/2016    Chronic obstructive pulmonary emphysema 02/11/2016    Lung mass 02/11/2016    Squamous cell carcinoma of left lung 03/24/2016    Lymphadenopathy, mediastinal 03/24/2016     Resolved Ambulatory Problems     Diagnosis Date Noted    No Resolved Ambulatory Problems     Past Medical History:   Diagnosis Date    Aftercare following surgery of the respiratory system     Cancer     COPD (chronic obstructive pulmonary disease)     Coronary artery disease     Disease of thyroid gland     High cholesterol     History of cardiac catheterization     History of chemotherapy     History of pneumonia     Hx of radiation therapy     Hyperlipidemia     Hypertension     Prostate cancer        PAST SURGICAL HISTORY  Past Surgical History:   Procedure Laterality Date    BIOPSY PROSTATE NEEDLE  / PUNCH / INCISIONAL      BRONCHOSCOPY N/A 3/30/2016    Procedure: BRONCHOSCOPY WITH ENDOBRONCHIAL ULTRASOUND and FINE NEEDLE ASPIRATION;  Surgeon: Lazaro Tripp MD;  Location: [facility] ENDOSCOPY;  Service:     CARDIAC CATHETERIZATION      CARDIAC ELECTROPHYSIOLOGY PROCEDURE N/A 7/21/2017    Procedure: Pacemaker DC new - MRI safe (he gets occasional MRI scans).  [place];  Surgeon: Gold Dickson MD;  Location: [facility] CATH INVASIVE LOCATION;  Service:     LUNG BIOPSY      PACEMAKER IMPLANTATION         FAMILY HISTORY  Family History   Problem Relation Age of Onset    Liver cancer Mother     Lung cancer Mother     Heart attack Father     Diabetes Sister     Brain cancer Brother     Lung cancer Brother        SOCIAL HISTORY  Social History     Socioeconomic History    Marital status:    Tobacco Use    Smoking status: Some Days     Current packs/day: 0.10     Average packs/day: 0.1 packs/day for 55.0 years (5.5 ttl pk-yrs)     Types: Cigarettes    Smokeless tobacco: Never    Tobacco comments:     1/3-1/2 ppd   Vaping Use    Vaping status: Never Used   Substance and Sexual Activity    Alcohol use: No    Drug use: No    Sexual activity: Defer       ALLERGIES  Latex and Sulfa antibiotics      Current Facility-Administered Medications:     Insert Peripheral IV, , , Once **AND** sodium chloride 0.9 % flush 10 mL, 10 mL, Intravenous, PRN, Emma Luna PA-C    Current Outpatient Medications:     amLODIPine (NORVASC) 5 MG tablet, TAKE 1 TABLET BY MOUTH TWICE DAILY FOR HIGH BLOOD PRESSURE, Disp: , Rfl:     AMLODIPINE BESYLATE PO, Take 2.5 mg by mouth Daily. (Patient not taking: Reported on 4/18/2024), Disp: , Rfl:     aspirin 81 MG tablet, Take 81 mg by mouth daily. (Patient not taking: Reported on 4/18/2024), Disp: , Rfl:     benzonatate (TESSALON) 100 MG capsule, Take 1 capsule by mouth 3 (Three) Times a Day As Needed for Cough for up to 5 days., Disp: 15 capsule, Rfl: 0    carvedilol (COREG) 6.25 MG  tablet, Take 1 tablet by mouth 2 (Two) Times a Day With Meals., Disp: , Rfl:     Chantix Continuing Month Chai 1 MG tablet, , Disp: , Rfl:     furosemide (LASIX) 20 MG tablet, Take 1 tablet by mouth As Needed., Disp: , Rfl:     levothyroxine (SYNTHROID, LEVOTHROID) 50 MCG tablet, Take 1 tablet by mouth Daily., Disp: , Rfl:     loratadine (CLARITIN) 10 MG tablet, Take 10 mg by mouth daily. (Patient not taking: Reported on 4/18/2024), Disp: , Rfl:     losartan (COZAAR) 50 MG tablet, Take 50 mg by mouth 2 (Two) Times a Day. (Patient not taking: Reported on 4/18/2024), Disp: , Rfl:     meloxicam (MOBIC) 15 MG tablet, Take 15 mg by mouth Daily As Needed. (Patient not taking: Reported on 4/18/2024), Disp: , Rfl:     methylPREDNISolone (MEDROL) 4 MG dose pack, Take as directed on package instructions. (Patient not taking: Reported on 4/18/2024), Disp: 21 tablet, Rfl: 0    ondansetron ODT (ZOFRAN-ODT) 4 MG disintegrating tablet, Take 1 tablet by mouth Every 8 (Eight) Hours As Needed for Nausea or Vomiting., Disp: 10 tablet, Rfl: 0    oxyCODONE-acetaminophen (PERCOCET) 5-325 MG per tablet, Take 1-2 tablets by mouth Every 4 (Four) Hours As Needed (Pain). (Patient not taking: Reported on 4/18/2024), Disp: 15 tablet, Rfl: 0    pravastatin (PRAVACHOL) 20 MG tablet, Take 20 mg by mouth every night. (Patient not taking: Reported on 4/18/2024), Disp: , Rfl:     warfarin (COUMADIN) 5 MG tablet, Take 1 tablet by mouth Daily. (Patient taking differently: Take 1 tablet by mouth Daily. 5 mg tablet on Tuesday & Saturday 2.5 mg on Sunday, Monday, Wednesday, Thursday & Friday), Disp: 30 tablet, Rfl: 11    PHYSICAL EXAM  ED Triage Vitals   Temp Heart Rate Resp BP SpO2   05/27/24 1431 05/27/24 1430 05/27/24 1429 05/27/24 1435 05/27/24 1429   97.4 °F (36.3 °C) 75 20 132/92 92 %      Temp src Heart Rate Source Patient Position BP Location FiO2 (%)   05/27/24 1431 -- -- -- --   Oral           Physical Exam  Vitals and nursing note reviewed.    Constitutional:       Appearance: Normal appearance. He is normal weight. He is not ill-appearing.   HENT:      Head: Normocephalic and atraumatic.      Nose: Nose normal.      Mouth/Throat:      Mouth: Mucous membranes are moist.   Eyes:      Extraocular Movements: Extraocular movements intact.      Conjunctiva/sclera: Conjunctivae normal.      Pupils: Pupils are equal, round, and reactive to light.   Cardiovascular:      Rate and Rhythm: Normal rate and regular rhythm.      Heart sounds: Normal heart sounds.   Pulmonary:      Effort: Pulmonary effort is normal.      Breath sounds: No rhonchi.   Abdominal:      General: Abdomen is flat. Bowel sounds are normal. There is no distension.      Palpations: Abdomen is soft.      Tenderness: There is no abdominal tenderness.      Hernia: No hernia is present.   Musculoskeletal:      Cervical back: Normal range of motion.   Skin:     General: Skin is warm.      Capillary Refill: Capillary refill takes less than 2 seconds.      Coloration: Skin is not pale.   Neurological:      General: No focal deficit present.      Mental Status: He is alert and oriented to person, place, and time.   Psychiatric:         Mood and Affect: Mood normal.           LAB RESULTS  Lab Results (last 24 hours)       Procedure Component Value Units Date/Time    CBC & Differential [[id number]]  (Abnormal) Collected: 05/27/24 1439    Specimen: Blood Updated: 05/27/24 1525    Narrative:      The following orders were created for panel order CBC & Differential.  Procedure                               Abnormality         Status                     ---------                               -----------         ------                     CBC Auto Differential[[id number]]        Abnormal            Final result               Scan Slide[[id number]]                                       Final result                 Please view results for these tests on the individual orders.    Comprehensive Metabolic Panel  [[id number]]  (Abnormal) Collected: 05/27/24 1439    Specimen: Blood Updated: 05/27/24 1512     Glucose 93 mg/dL      BUN 19 mg/dL      Creatinine 1.27 mg/dL      Sodium 137 mmol/L      Potassium 4.0 mmol/L      Chloride 102 mmol/L      CO2 23.0 mmol/L      Calcium 9.3 mg/dL      Total Protein 7.3 g/dL      Albumin 4.3 g/dL      ALT (SGPT) 15 U/L      AST (SGOT) 36 U/L      Alkaline Phosphatase 98 U/L      Total Bilirubin 0.6 mg/dL      Globulin 3.0 gm/dL      A/G Ratio 1.4 g/dL      BUN/Creatinine Ratio 15.0     Anion Gap 12.0 mmol/L      eGFR 56.8 mL/min/1.73     Narrative:      GFR Normal >60  Chronic Kidney Disease <60  Kidney Failure <15    The GFR formula is only valid for adults with stable renal function between ages 18 and 70.    Lipase [[id number]]  (Abnormal) Collected: 05/27/24 1439    Specimen: Blood Updated: 05/27/24 1512     Lipase 62 U/L     High Sensitivity Troponin T [[id number]]  (Abnormal) Collected: 05/27/24 1439    Specimen: Blood Updated: 05/27/24 1512     HS Troponin T 25 ng/L     Narrative:      High Sensitive Troponin T Reference Range:  <14.0 ng/L- Negative Female for AMI  <22.0 ng/L- Negative Male for AMI  >=14 - Abnormal Female indicating possible myocardial injury.  >=22 - Abnormal Male indicating possible myocardial injury.   Clinicians would have to utilize clinical acumen, EKG, Troponin, and serial changes to determine if it is an Acute Myocardial Infarction or myocardial injury due to an underlying chronic condition.         BNP [[id number]]  (Normal) Collected: 05/27/24 1439    Specimen: Blood Updated: 05/27/24 1512     proBNP 417.0 pg/mL     Narrative:      This assay is used as an aid in the diagnosis of individuals suspected of having heart failure. It can be used as an aid in the diagnosis of acute decompensated heart failure (ADHF) in patients presenting with signs and symptoms of ADHF to the emergency department (ED). In addition, NT-proBNP of <300 pg/mL indicates ADHF is not  likely.    Age Range Result Interpretation  NT-proBNP Concentration (pg/mL:      <50             Positive            >450                   Gray                 300-450                    Negative             <300    50-75           Positive            >900                  Gray                300-900                  Negative            <300      >75             Positive            >1800                  Gray                300-1800                  Negative            <300    Protime-INR [[id number]]  (Abnormal) Collected: 05/27/24 1439    Specimen: Blood Updated: 05/27/24 1514     Protime 32.6 Seconds      INR 3.27    Narrative:      Therapeutic Ranges for INR: 2.0-3.0 (PT 20-30)                              2.5-3.5 (PT 25-34)    CBC Auto Differential [[id number]]  (Abnormal) Collected: 05/27/24 1439    Specimen: Blood Updated: 05/27/24 1501     WBC 3.63 10*3/mm3      RBC 4.98 10*6/mm3      Hemoglobin 14.8 g/dL      Hematocrit 43.2 %      MCV 86.7 fL      MCH 29.7 pg      MCHC 34.3 g/dL      RDW 14.6 %      RDW-SD 46.7 fl      MPV 11.8 fL      Platelets 109 10*3/mm3      Neutrophil % 61.4 %      Lymphocyte % 23.1 %      Monocyte % 13.8 %      Eosinophil % 0.6 %      Basophil % 0.3 %      Immature Grans % 0.8 %      Neutrophils, Absolute 2.23 10*3/mm3      Lymphocytes, Absolute 0.84 10*3/mm3      Monocytes, Absolute 0.50 10*3/mm3      Eosinophils, Absolute 0.02 10*3/mm3      Basophils, Absolute 0.01 10*3/mm3      Immature Grans, Absolute 0.03 10*3/mm3      nRBC 0.0 /100 WBC     Scan Slide [[id number]] Collected: 05/27/24 1439    Specimen: Blood Updated: 05/27/24 1525     RBC Morphology Normal     WBC Morphology Normal     Large Platelets Slight/1+    High Sensitivity Troponin T 2Hr [[id number]]  (Abnormal) Collected: 05/27/24 1632    Specimen: Blood Updated: 05/27/24 1657     HS Troponin T 24 ng/L      Troponin T Delta -1 ng/L     Narrative:      High Sensitive Troponin T Reference Range:  <14.0 ng/L- Negative Female  for AMI  <22.0 ng/L- Negative Male for AMI  >=14 - Abnormal Female indicating possible myocardial injury.  >=22 - Abnormal Male indicating possible myocardial injury.   Clinicians would have to utilize clinical acumen, EKG, Troponin, and serial changes to determine if it is an Acute Myocardial Infarction or myocardial injury due to an underlying chronic condition.                   I ordered the above labs and reviewed the results    RADIOLOGY  CT Chest With Contrast Diagnostic    Result Date: 5/27/2024  CT CHEST W CONTRAST DIAGNOSTIC, CT ABDOMEN PELVIS W CONTRAST Date of Exam: 5/27/2024 3:42 PM EDT Indication: cough, chest pain. Comparison: None available. Technique: Axial CT images were obtained of the chest after the uneventful intravenous administration of iodinated contrast.  Sagittal and coronal reconstructions were performed.  Automated exposure control and iterative reconstruction methods were used. FINDINGS: Thoracic inlet: Unremarkable. Pulmonary arteries: This examination is not optimized for detection of pulmonary emboli. Great vessels: There is atherosclerotic plaque within the thoracic aorta and proximal arch vessels. Mediastinum/Monika: No pathologically enlarged mediastinal lymph nodes are seen. Small hiatal hernia. The esophagus is otherwise unremarkable. Lung parenchyma: Pulmonary emphysema. Spiculated masslike opacity within the left upper lobe abutting the major fissure measuring approximately 4 x 2.5 cm (series 5, image 38). There appear to be surgical changes of the right upper lobe with parenchymal scarring. Nonspecific 7 mm nodular focus anteriorly within the right upper lobe (series 5, image 92) no acute infiltrate is identified. Trachea and airways: The trachea and central airways appear unremarkable. Pleural space: No significant pleural effusion or pneumothorax is seen. Heart and pericardium: Coronary artery calcifications are present. Cardiac pacemaker leads are noted. No significant  pericardial effusion. Liver: The liver is unremarkable in morphology. Multiple liver granulomas. No biliary dilation. Gallbladder: Unremarkable. Pancreas: Unremarkable. Spleen: Several splenic granulomas. Adrenal glands: Unremarkable. Genitourinary tract: 4 cm cyst at the upper pole of the right kidney. Kidneys are otherwise unremarkable. No hydronephrosis is seen. The visualized portions of the ureters and urinary bladder appear unremarkable. There are surgical changes of the prostate gland. Gastrointestinal tract: Colonic diverticulosis is present. The hollow viscera appear otherwise unremarkable. There is no evidence of bowel obstruction. Appendix: Fluid-filled appendix appears within normal limits in caliber without periappendiceal stranding, likely within normal limits. Other findings: No free air or free fluid is identified. No pathologically enlarged lymph nodes are seen. Vascular calcifications are present. Ectasia of the distal abdominal aorta, measuring 2.8 cm. Bones and soft tissues: No acute osseous lesion is identified. Bones are demineralized. There are degenerative changes of the skeletal structures. Superficial soft tissues demonstrate no acute abnormality. Left chest wall pacemaker is present.     1.Pulmonary emphysema with spiculated masslike opacity within the left upper lobe abutting the major fissure measuring 4 x 2.5 cm (series 5, image 38). Neoplastic mass cannot be excluded. Of note, there was a hypermetabolic mass within the left upper lobe on the PET/CT from 3/17/2016. Findings on today's chest CT may be related to masslike scarring. Recommend further evaluation with PET/CT. 2.Nonspecific 7 mm nodule within the right upper lobe (series 5, image 92). 3.Surgical changes of the right upper lobe. 4.No acute abnormality is identified within the abdomen or pelvis. 5.Colonic diverticulosis. 6.Additional findings as detailed above. Electronically Signed: Doug Valdez MD  5/27/2024 4:11 PM EDT   Workstation ID: [id number]    CT Abdomen Pelvis With Contrast    Result Date: 5/27/2024  CT CHEST W CONTRAST DIAGNOSTIC, CT ABDOMEN PELVIS W CONTRAST Date of Exam: 5/27/2024 3:42 PM EDT Indication: cough, chest pain. Comparison: None available. Technique: Axial CT images were obtained of the chest after the uneventful intravenous administration of iodinated contrast.  Sagittal and coronal reconstructions were performed.  Automated exposure control and iterative reconstruction methods were used. FINDINGS: Thoracic inlet: Unremarkable. Pulmonary arteries: This examination is not optimized for detection of pulmonary emboli. Great vessels: There is atherosclerotic plaque within the thoracic aorta and proximal arch vessels. Mediastinum/Monika: No pathologically enlarged mediastinal lymph nodes are seen. Small hiatal hernia. The esophagus is otherwise unremarkable. Lung parenchyma: Pulmonary emphysema. Spiculated masslike opacity within the left upper lobe abutting the major fissure measuring approximately 4 x 2.5 cm (series 5, image 38). There appear to be surgical changes of the right upper lobe with parenchymal scarring. Nonspecific 7 mm nodular focus anteriorly within the right upper lobe (series 5, image 92) no acute infiltrate is identified. Trachea and airways: The trachea and central airways appear unremarkable. Pleural space: No significant pleural effusion or pneumothorax is seen. Heart and pericardium: Coronary artery calcifications are present. Cardiac pacemaker leads are noted. No significant pericardial effusion. Liver: The liver is unremarkable in morphology. Multiple liver granulomas. No biliary dilation. Gallbladder: Unremarkable. Pancreas: Unremarkable. Spleen: Several splenic granulomas. Adrenal glands: Unremarkable. Genitourinary tract: 4 cm cyst at the upper pole of the right kidney. Kidneys are otherwise unremarkable. No hydronephrosis is seen. The visualized portions of the ureters and urinary bladder  appear unremarkable. There are surgical changes of the prostate gland. Gastrointestinal tract: Colonic diverticulosis is present. The hollow viscera appear otherwise unremarkable. There is no evidence of bowel obstruction. Appendix: Fluid-filled appendix appears within normal limits in caliber without periappendiceal stranding, likely within normal limits. Other findings: No free air or free fluid is identified. No pathologically enlarged lymph nodes are seen. Vascular calcifications are present. Ectasia of the distal abdominal aorta, measuring 2.8 cm. Bones and soft tissues: No acute osseous lesion is identified. Bones are demineralized. There are degenerative changes of the skeletal structures. Superficial soft tissues demonstrate no acute abnormality. Left chest wall pacemaker is present.     1.Pulmonary emphysema with spiculated masslike opacity within the left upper lobe abutting the major fissure measuring 4 x 2.5 cm (series 5, image 38). Neoplastic mass cannot be excluded. Of note, there was a hypermetabolic mass within the left upper lobe on the PET/CT from 3/17/2016. Findings on today's chest CT may be related to masslike scarring. Recommend further evaluation with PET/CT. 2.Nonspecific 7 mm nodule within the right upper lobe (series 5, image 92). 3.Surgical changes of the right upper lobe. 4.No acute abnormality is identified within the abdomen or pelvis. 5.Colonic diverticulosis. 6.Additional findings as detailed above. Electronically Signed: Doug Valdez MD  5/27/2024 4:11 PM EDT  Workstation ID: [id number]    XR Chest 1 View    Result Date: 5/27/2024  XR CHEST 1 VW Date of Exam: 5/27/2024 3:28 PM EDT Indication: short of breath with cough Comparison: 7/30/2022 Findings: Improved bilateral infiltrates seen previously with a residual left lower lobe infiltrate. No pneumothorax or pleural effusion. Dual-lead cardiac pacemaker. No rib fractures. The visualized upper abdomen is normal.     Left upper lobe  infiltrate again noted. Electronically Signed: Romain Mcclure MD  5/27/2024 3:36 PM EDT  Workstation ID: [id number]     I ordered the above radiologic testing and reviewed the results    PROCEDURES  Procedures      PROGRESS AND CONSULTS           MEDICAL DECISION MAKING    MDM     Amount and/or Complexity of Data Reviewed  Clinical lab tests: reviewed    78yo male seen and evaluated for complaint of cough and vomiting.  On arrival patient is alert, oriented and in no acute distress.  Vitals 132/92, heart rate 75, afebrile and 92% on room air.  On exam patient is moving air bilaterally, no abdominal tenderness with palpation.  CBC reveals no leukocytosis, hemoglobin is 14.8.  CMP reveals no significant abnormalities renal function is within normal limits.  Troponin was 25 which is slightly elevated from the normal limit this will be trended over 2 hours and was 24.  Delta change of 1.  INR is 3.27.  Lipase 62.  Patient did have a chest x-ray performed which revealed some abnormalities therefore chest abdomen pelvis CT then performed.IMPRESSION:  1.Pulmonary emphysema with spiculated masslike opacity within the left upper lobe abutting the major fissure measuring 4 x 2.5 cm (series 5, image 38). Neoplastic mass cannot be excluded. Of note, there was a hypermetabolic mass within the left upper   lobe on the PET/CT from 3/17/2016. Findings on today's chest CT may be related to masslike scarring. Recommend further evaluation with PET/CT.  2.Nonspecific 7 mm nodule within the right upper lobe (series 5, image 92).  3.Surgical changes of the right upper lobe.  4.No acute abnormality is identified within the abdomen or pelvis.  5.Colonic diverticulosis.  6.Additional findings as detailed above.    Patient was given Tessalon Perles for his cough and Zofran for nausea.  In regards to patient's chronic cough and the way he has been feeling ill I wonder if this may be secondary to the masses patient has in his lungs which is likely  a recurrence of cancer.  Patient is very hesitant about following up in [place] for a biopsy however he states he is going to see his primary care doctor tomorrow and will talk with her more about this.  I do not see any indication for hospitalization at this time and patient is seeking to return home as well.  Therefore cough medicine and nausea medicine sent to the pharmacy.  Patient was discharged home in stable condition with his son.  Strict return precautions were given.       DIAGNOSIS  Final diagnoses:   Subacute cough   Nausea and vomiting, unspecified vomiting type       Latest Documented Vital Signs:  As of 17:07 EDT  BP- 132/92 HR- 75 Temp- 97.4 °F (36.3 °C) (Oral) O2 sat- 92%    DISPOSITION    Discussed pertinent findings with the patient/family.  Patient/Family voiced understanding of need to follow-up for recheck and further testing as needed.  Return to the Emergency Department warnings were given.         Medication List        New Prescriptions      benzonatate 100 MG capsule  Commonly known as: TESSALON  Take 1 capsule by mouth 3 (Three) Times a Day As Needed for Cough for up to 5 days.     ondansetron ODT 4 MG disintegrating tablet  Commonly known as: ZOFRAN-ODT  Take 1 tablet by mouth Every 8 (Eight) Hours As Needed for Nausea or Vomiting.            Changed      warfarin 5 MG tablet  Commonly known as: COUMADIN  Take 1 tablet by mouth Daily.  What changed: additional instructions               Where to Get Your Medications        These medications were sent to [facility] [address] [place] [address] [place] - [address] - [phone]  - [phone] [facility] [place] [address]      Phone: [phone]   benzonatate 100 MG capsule  ondansetron ODT 4 MG disintegrating tablet              Follow-up Information       Kelsy Clay MD. Call in 1 week.    Specialties: Family Medicine, Emergency Medicine  Why: To schedule follow up appointment  Contact information:  [address] [facility]  [address]  [facility] [address] [phone]                               Dictated utilizing Dragon dictation     Emma Luna PA-C  05/27/24 [id number]

## 2024-05-27 NOTE — DISCHARGE INSTRUCTIONS
Medications for cough and nausea sent to the pharmacy.  Please follow-up with your primary doctor and I highly encourage you to go to the biopsy appointment at [facility] in [place].

## 2024-06-11 ENCOUNTER — TELEPHONE (OUTPATIENT)
Age: 79
End: 2024-06-11
Payer: MEDICARE

## 2024-06-11 NOTE — TELEPHONE ENCOUNTER
Pt with [facility] DDDR pacer for SSS, PAF. Pt is not dependent by Hx    Alert remote report received. Since 4/27 remote, 13 VT and 65 NST labeled events have recorded. Multiple epispodes from 6/6 and 6/10. Longest from 6/6 lasting 6.5 min with Vrate 172bpm. SVT vs VT?  Please advise. Strips below are from the same event. I have more strips if needed. Episode log also below.     I spoke with pt. He has felt really bad off and on since a reaction to CT dye mid May but much worse the last few days. Yesterday 6/10 was the worst with dizziness, SOA and chest tightness. All increased with activity, but did occur also at rest. Pt has not missed any of his meds. Pt checks his BP regularly and has been running OK. 110s-120s/50s-60s yesterday and today. Pt does not feel great today but much improved from yesterday. Pt reports he is sched for cataract surgery tomorrow 6/12.     Presents AS/VS 60s at time of transmission 6/11 0650. AT/AF episodes recorded but none since 6/4 lasting 46 min. Strips show true AT/AF with avg V rate 80s-90s. Hx PAF, AC Warfarin. [place] <1%. Pt also reports INR was 5 yesterday and currently coumadin is held until Friday, next recheck.  NS                                  .

## 2024-06-12 ENCOUNTER — TELEPHONE (OUTPATIENT)
Dept: CARDIOLOGY | Facility: CLINIC | Age: 79
End: 2024-06-12
Payer: MEDICARE

## 2024-06-12 NOTE — TELEPHONE ENCOUNTER
No answer on telephone.  Patient is on carvedilol 6.25 mg twice daily with a heart rate in the high 50s in clinic.  I do not know that we have much room here and I would like to hear how symptomatic he really is with these runs of atrial tachycardia.  Will discuss symptoms when he calls the clinic.

## 2024-06-17 NOTE — TELEPHONE ENCOUNTER
Spoke to pt. He had 309 new NSVT/SVT labeled events that avail egms appear the A tachy that has been reported previously. The longest is 6 mins 50 seconds on 6/14/24 rates of 170s- strips show 1:1 AV conduction with tachy bursts. 16 AF all on 6/14/24 with longest being 1 min 44 seconds V rates 120s- on AC burden is <1%..  He told he his son suddenly passed away last week on 6/14/24 so that could have something to do with all of those events that day. He said he has been off his coumadin due to high readings and is going Friday for testing and to get back on track.

## 2024-08-07 ENCOUNTER — TELEPHONE (OUTPATIENT)
Dept: CARDIOLOGY | Facility: CLINIC | Age: 79
End: 2024-08-07

## 2024-08-07 NOTE — TELEPHONE ENCOUNTER
"Pt called the office today c/o LUE heaviness/weakness that has been occurring since yesterday but has worsened today.  Pt also tells me \"I stagger/sway to the left when walking\".  Pt exhibits no signs of dysarthria on the phone.  Pt thought the staggering might have been due to an inner ear infection.  He also fell a few days ago when getting out of bed (did not hit his head), which he attributed to the inner ear infection.  Pt denies pain anywhere or nausea.  He endorses PINEDA.  BP today 143/69, HR 60.  Pt has been taking his warfarin.  He sees his PCP on 8/16/24.    Due to pt's symptoms, I encouraged for him to go to the ED to be evaluated for a stroke.  Pt prefers to hear what MD recommends.  Do you have any recommendations for this patient?    Thank you,    Leyda LAGUNA RN  [facility] Triage  08/07/24  13:46 EDT    "

## 2024-08-07 NOTE — TELEPHONE ENCOUNTER
I spoke with Vinod Cardenas and gave them message from the provider.  They verbalized understanding & have no further questions at this time.    Pt tells me that he needs to take his grandson to work.  He says that if his symptoms worsen, he will go to the ED.  I advised pt that he should not be driving, nor should he wait in being evaluated in the ED because if he is having a stroke, this can result in death.  Pt v/u risks.    Thank you,    Leyda LAGUNA , RN  Triage [facility]  08/07/24 14:06 EDT

## 2024-08-07 NOTE — TELEPHONE ENCOUNTER
Pt called stating he is experiencing left arm heaviness, he states, no pain.  Can you please call to triage?  Thanks/chalo

## 2025-01-28 ENCOUNTER — TELEPHONE (OUTPATIENT)
Dept: CARDIOLOGY | Facility: CLINIC | Age: 80
End: 2025-01-28
Payer: MEDICARE

## 2025-01-28 NOTE — TELEPHONE ENCOUNTER
[facility] called regarding Mr. Cardenas.  He is due to start radiation treatments next week on his lung.  They requesting a letter with the following information:   Okay to proceed with the treatments from a cardiac standpoint?  Is the patient pacemaker dependent?    [facility]# [phone]  Fax# [phone]

## 2025-01-29 NOTE — TELEPHONE ENCOUNTER
I called and s/w Aure.  She states they would like a letter faxed to them.  Are you able to dictate a letter and I will fax when completed.  Thanks/jlf

## 2025-06-13 ENCOUNTER — TELEPHONE (OUTPATIENT)
Age: 80
End: 2025-06-13
Payer: MEDICAID

## 2025-06-13 NOTE — TELEPHONE ENCOUNTER
Pt has a Andrews dual chamber pacemaker.     Since 3/11/25, pt has had 1 VT event, 243 SVT events, 260 NSVT events, and 240 ATR events with the majority of these events on the logbook occurring on 6/8/25 and 6/9/25.     After reviewing available EGMs, the VT event and the SVT and NSVT events all appear to mostly be AF with RVR vs. some possible bursts of VT? There does occasionally appear to be some regular R-R intervals throughout EGMs and on presenting. Logbook and snapshots included below:                                          Presenting rhythm on 6/9/25 remote:        It is also of note that pt was taken to the ED at [facility] on 6/8/25 after he fell at home and was unknown how long he had been down. Per ED note, pt was confused when he was brought in. Also noted that pt has hx of lung cancer with brain mets and is currently in hospice.     I just wanted to make you all aware of these recent events. Pt does have a hx of PAF and is on warfarin.     Pt was a no show to appt with Dr. Abdullahi and a device check on 5/8/25.

## 2025-06-16 NOTE — TELEPHONE ENCOUNTER
He has been a  no-show to his last 2 appointments.  Scheduling- please call him to discuss scheduling a routine follow-up appointment here in our office?

## (undated) DEVICE — INTRO PEELAWAY SAFESHEATH II HEMO 7F23CM

## (undated) DEVICE — Device

## (undated) DEVICE — RADIFOCUS TORQUE DEVICE MULTI-TORQUE VISE: Brand: RADIFOCUS TORQUE DEVICE

## (undated) DEVICE — SOL NS 500ML

## (undated) DEVICE — RADIFOCUS GLIDEWIRE: Brand: GLIDEWIRE

## (undated) DEVICE — DRSNG SURESITE WNDW 4X4.5

## (undated) DEVICE — LIMB HOLDER, WRIST/ANKLE: Brand: DEROYAL

## (undated) DEVICE — INTRO SHEATH PRELUDE SNAP .038 6F 13CM W/SDPRT

## (undated) DEVICE — LOU PACE DEFIB: Brand: MEDLINE INDUSTRIES, INC.

## (undated) DEVICE — ELECTRD ECG CARBON/SNP RL FM A/ 5PK

## (undated) DEVICE — DRSNG TELFA PAD NONADH STR 1S 3X8IN

## (undated) DEVICE — AIRLIFE™ NASAL OXYGEN CANNULA CURVED, NONFLARED TIP WITH 21 FOOT (6.4 M) CRUSH-RESISTANT TUBING, OVER-THE-EAR STYLE: Brand: AIRLIFE™

## (undated) DEVICE — ST INF 2NDARY 20DRP VNT/NOVNT 30IN